# Patient Record
Sex: FEMALE | Race: WHITE | Employment: UNEMPLOYED | URBAN - METROPOLITAN AREA
[De-identification: names, ages, dates, MRNs, and addresses within clinical notes are randomized per-mention and may not be internally consistent; named-entity substitution may affect disease eponyms.]

---

## 2019-06-17 LAB
AVERAGE GLUCOSE: 114
CHOLESTEROL, TOTAL: 154 MG/DL
CHOLESTEROL/HDL RATIO: 2.9
CREATININE: 0.9 MG/DL
HBA1C MFR BLD: 5.6 %
HDLC SERPL-MCNC: 54 MG/DL (ref 35–70)
LDL CHOLESTEROL CALCULATED: 77 MG/DL (ref 0–160)
POTASSIUM (K+): 4.2
TRIGL SERPL-MCNC: 116 MG/DL
VLDLC SERPL CALC-MCNC: 23 MG/DL

## 2020-04-03 VITALS — RESPIRATION RATE: 14 BRPM | HEART RATE: 68 BPM | DIASTOLIC BLOOD PRESSURE: 70 MMHG | SYSTOLIC BLOOD PRESSURE: 130 MMHG

## 2020-04-03 RX ORDER — SIMVASTATIN 20 MG
20 TABLET ORAL NIGHTLY
COMMUNITY
End: 2021-06-01 | Stop reason: SDUPTHER

## 2020-04-03 RX ORDER — LISINOPRIL 5 MG/1
5 TABLET ORAL DAILY
COMMUNITY
End: 2021-06-01 | Stop reason: SDUPTHER

## 2020-04-03 RX ORDER — METOPROLOL SUCCINATE 25 MG/1
25 TABLET, EXTENDED RELEASE ORAL DAILY
COMMUNITY
End: 2020-10-22 | Stop reason: SDUPTHER

## 2020-04-03 RX ORDER — FAMOTIDINE 20 MG/1
20 TABLET, FILM COATED ORAL 2 TIMES DAILY
COMMUNITY

## 2020-04-03 RX ORDER — OMEPRAZOLE 20 MG/1
20 CAPSULE, DELAYED RELEASE ORAL DAILY
COMMUNITY

## 2020-07-14 LAB
AVERAGE GLUCOSE: NORMAL
CHOLESTEROL, TOTAL: 196 MG/DL
CHOLESTEROL/HDL RATIO: NORMAL
CREATININE: 0.9 MG/DL
HBA1C MFR BLD: 5.4 %
HDLC SERPL-MCNC: 58 MG/DL (ref 35–70)
LDL CHOLESTEROL CALCULATED: 112 MG/DL (ref 0–160)
POTASSIUM (K+): 4.5
TRIGL SERPL-MCNC: 132 MG/DL
VLDLC SERPL CALC-MCNC: 26 MG/DL

## 2020-07-22 VITALS
RESPIRATION RATE: 17 BRPM | TEMPERATURE: 96.8 F | HEIGHT: 61 IN | WEIGHT: 159 LBS | BODY MASS INDEX: 30.02 KG/M2 | SYSTOLIC BLOOD PRESSURE: 122 MMHG | HEART RATE: 62 BPM | DIASTOLIC BLOOD PRESSURE: 75 MMHG

## 2020-07-22 RX ORDER — WARFARIN SODIUM 2 MG/1
2 TABLET ORAL
COMMUNITY
End: 2021-11-05

## 2020-08-03 VITALS — DIASTOLIC BLOOD PRESSURE: 76 MMHG | SYSTOLIC BLOOD PRESSURE: 120 MMHG | RESPIRATION RATE: 12 BRPM | HEART RATE: 64 BPM

## 2020-08-05 LAB
INTERNATIONAL NORMALIZATION RATIO, POC: 1.8
PROTHROMBIN TIME, POC: 21.2

## 2020-10-22 ENCOUNTER — OFFICE VISIT (OUTPATIENT)
Dept: FAMILY MEDICINE CLINIC | Age: 85
End: 2020-10-22
Payer: MEDICARE

## 2020-10-22 VITALS
HEART RATE: 66 BPM | WEIGHT: 155 LBS | SYSTOLIC BLOOD PRESSURE: 130 MMHG | OXYGEN SATURATION: 96 % | HEIGHT: 62 IN | BODY MASS INDEX: 28.52 KG/M2 | DIASTOLIC BLOOD PRESSURE: 78 MMHG

## 2020-10-22 PROBLEM — I10 HYPERTENSION: Status: ACTIVE | Noted: 2020-10-22

## 2020-10-22 PROBLEM — E78.49 OTHER HYPERLIPIDEMIA: Status: ACTIVE | Noted: 2020-10-22

## 2020-10-22 PROBLEM — M19.91 PRIMARY OSTEOARTHRITIS: Status: ACTIVE | Noted: 2020-10-22

## 2020-10-22 PROBLEM — K21.9 GASTROESOPHAGEAL REFLUX DISEASE WITHOUT ESOPHAGITIS: Status: ACTIVE | Noted: 2020-10-22

## 2020-10-22 PROCEDURE — 90694 VACC AIIV4 NO PRSRV 0.5ML IM: CPT | Performed by: INTERNAL MEDICINE

## 2020-10-22 PROCEDURE — G0444 DEPRESSION SCREEN ANNUAL: HCPCS | Performed by: INTERNAL MEDICINE

## 2020-10-22 PROCEDURE — 99213 OFFICE O/P EST LOW 20 MIN: CPT | Performed by: INTERNAL MEDICINE

## 2020-10-22 PROCEDURE — G0008 ADMIN INFLUENZA VIRUS VAC: HCPCS | Performed by: INTERNAL MEDICINE

## 2020-10-22 RX ORDER — METOPROLOL SUCCINATE 25 MG/1
25 TABLET, EXTENDED RELEASE ORAL DAILY
Qty: 90 TABLET | Refills: 1 | Status: SHIPPED
Start: 2020-10-22 | End: 2021-05-10 | Stop reason: SDUPTHER

## 2020-10-22 ASSESSMENT — PATIENT HEALTH QUESTIONNAIRE - PHQ9
4. FEELING TIRED OR HAVING LITTLE ENERGY: 0
5. POOR APPETITE OR OVEREATING: 0
7. TROUBLE CONCENTRATING ON THINGS, SUCH AS READING THE NEWSPAPER OR WATCHING TELEVISION: 0
SUM OF ALL RESPONSES TO PHQ QUESTIONS 1-9: 2
SUM OF ALL RESPONSES TO PHQ QUESTIONS 1-9: 2
8. MOVING OR SPEAKING SO SLOWLY THAT OTHER PEOPLE COULD HAVE NOTICED. OR THE OPPOSITE, BEING SO FIGETY OR RESTLESS THAT YOU HAVE BEEN MOVING AROUND A LOT MORE THAN USUAL: 0
10. IF YOU CHECKED OFF ANY PROBLEMS, HOW DIFFICULT HAVE THESE PROBLEMS MADE IT FOR YOU TO DO YOUR WORK, TAKE CARE OF THINGS AT HOME, OR GET ALONG WITH OTHER PEOPLE: 0
SUM OF ALL RESPONSES TO PHQ QUESTIONS 1-9: 2
SUM OF ALL RESPONSES TO PHQ9 QUESTIONS 1 & 2: 2
3. TROUBLE FALLING OR STAYING ASLEEP: 0
2. FEELING DOWN, DEPRESSED OR HOPELESS: 1
1. LITTLE INTEREST OR PLEASURE IN DOING THINGS: 1
9. THOUGHTS THAT YOU WOULD BE BETTER OFF DEAD, OR OF HURTING YOURSELF: 0

## 2020-10-22 NOTE — PROGRESS NOTES
Subjective: Follow-up on hypertension  Knees are hurting I gave her injection last time did not help much  She has severe osteoarthritis  Recently was seen in orthopedic  Under a lot of stress daughter was diagnosed with leukemia  Patient was counseled     Chief Complaint   Patient presents with    3 Month Follow-Up        Past Medical History:   Diagnosis Date    [facility]     [facility] ([facility]     hx breast cancer     Depression     GERD (gastroesophageal reflux disease)     Hiatal hernia     Hyperlipidemia     Hypertension     Mitral valve regurgitation     Osteoarthritis     right knee        Social History     Socioeconomic History    Marital status:       Spouse name: Not on file    Number of children: Not on file    Years of education: Not on file    Highest education level: Not on file   Occupational History    Not on file   Social Needs    Financial resource strain: Not on file    Food insecurity     Worry: Not on file     Inability: Not on file    Transportation needs     Medical: Not on file     Non-medical: Not on file   Tobacco Use    Smoking status: Former Smoker     Packs/day: 0.50     Start date: 200     Last attempt to quit: 4/3/1982     Years since quittin.5    Smokeless tobacco: Never Used   Substance and Sexual Activity    Alcohol use: Not Currently    Drug use: Never    Sexual activity: Not on file   Lifestyle    Physical activity     Days per week: Not on file     Minutes per session: Not on file    Stress: Not on file   Relationships    Social connections     Talks on phone: Not on file     Gets together: Not on file     Attends L[employer] service: Not on file     Active member of club or organization: Not on file     Attends meetings of clubs or organizations: Not on file     Relationship status: Not on file    Intimate partner violence     Fear of current or ex partner: Not on file     Emotionally abused: Not on file     Physically abused:  07/07/2017 09:05 AM     10/19/2016 09:15 AM     01/20/2016 10:53 AM    K 4.5 07/14/2020    K 4.2 06/17/2019    K 4.0 07/07/2017 09:05 AM    K 4.3 10/19/2016 09:15 AM    K 4.1 01/20/2016 10:53 AM     07/07/2017 09:05 AM     10/19/2016 09:15 AM     01/20/2016 10:53 AM    CO2 23 07/07/2017 09:05 AM    CO2 23 10/19/2016 09:15 AM    CO2 23 01/20/2016 10:53 AM    BUN 20 07/07/2017 09:05 AM    BUN 14 10/19/2016 09:15 AM    BUN 16 01/20/2016 10:53 AM    CREATININE 0.9 07/14/2020    CREATININE 0.9 06/17/2019    CREATININE 1.0 07/07/2017 09:05 AM    CREATININE 1.0 10/19/2016 09:15 AM    CREATININE 0.9 01/20/2016 10:53 AM    GLUCOSE 89 07/07/2017 09:05 AM    GLUCOSE 86 10/19/2016 09:15 AM    GLUCOSE 83 01/20/2016 10:53 AM    GLUCOSE 95 01/27/2012 09:30 AM    GLUCOSE 92 11/10/2010 09:59 AM    CALCIUM 9.5 07/07/2017 09:05 AM    CALCIUM 10.1 10/19/2016 09:15 AM    CALCIUM 9.7 01/20/2016 10:53 AM       Last 3 CMP:    Lab Results   Component Value Date/Time     07/07/2017 09:05 AM     10/19/2016 09:15 AM     01/20/2016 10:53 AM    K 4.5 07/14/2020    K 4.2 06/17/2019    K 4.0 07/07/2017 09:05 AM    K 4.3 10/19/2016 09:15 AM    K 4.1 01/20/2016 10:53 AM     07/07/2017 09:05 AM     10/19/2016 09:15 AM     01/20/2016 10:53 AM    CO2 23 07/07/2017 09:05 AM    CO2 23 10/19/2016 09:15 AM    CO2 23 01/20/2016 10:53 AM    BUN 20 07/07/2017 09:05 AM    BUN 14 10/19/2016 09:15 AM    BUN 16 01/20/2016 10:53 AM    CREATININE 0.9 07/14/2020    CREATININE 0.9 06/17/2019    CREATININE 1.0 07/07/2017 09:05 AM    CREATININE 1.0 10/19/2016 09:15 AM    CREATININE 0.9 01/20/2016 10:53 AM    GLUCOSE 89 07/07/2017 09:05 AM    GLUCOSE 86 10/19/2016 09:15 AM    GLUCOSE 83 01/20/2016 10:53 AM    GLUCOSE 95 01/27/2012 09:30 AM    GLUCOSE 92 11/10/2010 09:59 AM    CALCIUM 9.5 07/07/2017 09:05 AM    CALCIUM 10.1 10/19/2016 09:15 AM    CALCIUM 9.7 01/20/2016 10:53 AM    PROT 7.0 07/07/2017 09:05 AM    PROT 7.7 10/19/2016 09:15 AM    PROT 7.2 01/20/2016 10:53 AM    LABALBU 4.0 07/07/2017 09:05 AM    LABALBU 4.1 10/19/2016 09:15 AM    LABALBU 4.1 01/20/2016 10:53 AM    LABALBU 4.3 01/27/2012 09:30 AM    LABALBU 4.1 11/10/2010 09:59 AM    BILITOT 0.4 07/07/2017 09:05 AM    BILITOT 0.3 10/19/2016 09:15 AM    BILITOT 0.4 01/20/2016 10:53 AM    ALKPHOS 46 07/07/2017 09:05 AM    ALKPHOS 48 10/19/2016 09:15 AM    ALKPHOS 49 01/20/2016 10:53 AM    AST 23 07/07/2017 09:05 AM    AST 27 10/19/2016 09:15 AM    AST 24 01/20/2016 10:53 AM    ALT 15 07/07/2017 09:05 AM    ALT 15 10/19/2016 09:15 AM    ALT 16 01/20/2016 10:53 AM        CBC:   Lab Results   Component Value Date/Time    WBC 5.8 07/07/2017 09:05 AM    RBC 4.27 07/07/2017 09:05 AM    HGB 13.0 07/07/2017 09:05 AM    HCT 39.1 07/07/2017 09:05 AM    MCV 91.6 07/07/2017 09:05 AM    MCH 30.4 07/07/2017 09:05 AM    MCHC 33.2 07/07/2017 09:05 AM    RDW 12.6 07/07/2017 09:05 AM     07/07/2017 09:05 AM    MPV 10.4 07/07/2017 09:05 AM       A1C:  Lab Results   Component Value Date/Time    LABA1C 5.4 07/14/2020       Lipid panel:  Lab Results   Component Value Date    CHOL 196 07/14/2020    CHOL 154 06/17/2019    CHOL 156 07/07/2017    TRIG 132 07/14/2020    TRIG 116 06/17/2019    TRIG 126 07/07/2017    HDL 58 07/14/2020    HDL 54 06/17/2019    HDL 53 07/07/2017        Lab Results   Component Value Date/Time    PROT 7.0 07/07/2017 09:05 AM    PROT 7.7 10/19/2016 09:15 AM    PROT 7.2 01/20/2016 10:53 AM    INR 1.8 08/05/2020       No results found for: MG      Assessment. Edwina was seen today for 3 month follow-up. Diagnoses and all orders for this visit:    Hypertension, unspecified type  -     metoprolol succinate (TOPROL XL) 25 MG extended release tablet;  Take 1 tablet by mouth daily    Other hyperlipidemia    Gastroesophageal reflux disease without esophagitis    Other orders  -     INFLUENZA, QUADV, ADJUVANTED, 65 YRS =, IM, PF, PREFILL SYR, 0.5ML (FLUAD)       Patient Active Problem List   Diagnosis    Hypertension    Other hyperlipidemia    Primary osteoarthritis    Gastroesophageal reflux disease without esophagitis       Plan: Her stool was Hemoccult positive last time she is refusing to see a GI  I told her other injection in the knee may not help with the cortisone she can have some gel shots with the orthopedic she will decide and let me know  Stay on the same blood pressure medication  She had a Holter monitor placed by cardiology final report is pending  Blood pressure good control    Return in about 3 months (around 1/22/2021) [facility]. Rohan Thompson MD  4:53 PM  10/22/2020     [place]

## 2020-11-18 ENCOUNTER — TELEPHONE (OUTPATIENT)
Dept: FAMILY MEDICINE CLINIC | Age: 85
End: 2020-11-18

## 2020-11-18 NOTE — TELEPHONE ENCOUNTER
Pt daughter called pt saw Dr. Dacia Mckoy was put her on coumadin 2.5 mg qd started 11/02/2020    pt to have tooth pulled how long to be off coumadin .     And will you follow her for the coumadin

## 2021-03-01 ENCOUNTER — NURSE ONLY (OUTPATIENT)
Dept: FAMILY MEDICINE CLINIC | Age: 86
End: 2021-03-01
Payer: MEDICARE

## 2021-03-01 DIAGNOSIS — Q84.6 ABNORMALITY OF NAIL TISSUE: ICD-10-CM

## 2021-03-01 DIAGNOSIS — H61.23 IMPACTED CERUMEN OF BOTH EARS: Primary | ICD-10-CM

## 2021-03-01 DIAGNOSIS — I48.91 ATRIAL FIBRILLATION, UNSPECIFIED TYPE (HCC): ICD-10-CM

## 2021-03-01 LAB
INTERNATIONAL NORMALIZATION RATIO, POC: 1.6
PROTHROMBIN TIME, POC: NORMAL

## 2021-03-01 PROCEDURE — 85610 PROTHROMBIN TIME: CPT | Performed by: INTERNAL MEDICINE

## 2021-03-01 PROCEDURE — 69210 REMOVE IMPACTED EAR WAX UNI: CPT | Performed by: INTERNAL MEDICINE

## 2021-03-01 NOTE — PROGRESS NOTES
Ear wax removal both ears. Canals clear no redness.       INR 1.6. patient is taking 5mg, 5mg, 2.5     Patient would like referral to podiatrist for nail trimming

## 2021-03-15 ENCOUNTER — NURSE ONLY (OUTPATIENT)
Dept: FAMILY MEDICINE CLINIC | Age: 86
End: 2021-03-15
Payer: MEDICARE

## 2021-03-15 DIAGNOSIS — I48.91 ATRIAL FIBRILLATION, UNSPECIFIED TYPE (HCC): ICD-10-CM

## 2021-03-15 LAB
INTERNATIONAL NORMALIZATION RATIO, POC: 4.2
INTERNATIONAL NORMALIZATION RATIO, POC: 4.2
PROTHROMBIN TIME, POC: NORMAL
PROTHROMBIN TIME, POC: NORMAL

## 2021-03-15 PROCEDURE — 85610 PROTHROMBIN TIME: CPT | Performed by: INTERNAL MEDICINE

## 2021-03-29 ENCOUNTER — NURSE ONLY (OUTPATIENT)
Dept: FAMILY MEDICINE CLINIC | Age: 86
End: 2021-03-29
Payer: MEDICARE

## 2021-03-29 DIAGNOSIS — I48.91 ATRIAL FIBRILLATION, UNSPECIFIED TYPE (HCC): ICD-10-CM

## 2021-03-29 LAB
INTERNATIONAL NORMALIZATION RATIO, POC: 2.9
PROTHROMBIN TIME, POC: NORMAL

## 2021-03-29 PROCEDURE — 85610 PROTHROMBIN TIME: CPT | Performed by: INTERNAL MEDICINE

## 2021-04-29 ENCOUNTER — OFFICE VISIT (OUTPATIENT)
Dept: FAMILY MEDICINE CLINIC | Age: 86
End: 2021-04-29
Payer: MEDICARE

## 2021-04-29 VITALS
TEMPERATURE: 96.8 F | OXYGEN SATURATION: 93 % | HEART RATE: 64 BPM | SYSTOLIC BLOOD PRESSURE: 134 MMHG | WEIGHT: 146 LBS | BODY MASS INDEX: 26.7 KG/M2 | DIASTOLIC BLOOD PRESSURE: 80 MMHG

## 2021-04-29 DIAGNOSIS — R73.9 HYPERGLYCEMIA: ICD-10-CM

## 2021-04-29 DIAGNOSIS — I48.91 ATRIAL FIBRILLATION, UNSPECIFIED TYPE (HCC): ICD-10-CM

## 2021-04-29 DIAGNOSIS — I10 ESSENTIAL HYPERTENSION: ICD-10-CM

## 2021-04-29 DIAGNOSIS — H40.9 GLAUCOMA, UNSPECIFIED GLAUCOMA TYPE, UNSPECIFIED LATERALITY: ICD-10-CM

## 2021-04-29 DIAGNOSIS — Z85.3 HISTORY OF BREAST CANCER: ICD-10-CM

## 2021-04-29 DIAGNOSIS — M17.12 OSTEOARTHRITIS OF LEFT KNEE, UNSPECIFIED OSTEOARTHRITIS TYPE: ICD-10-CM

## 2021-04-29 DIAGNOSIS — K21.9 GASTROESOPHAGEAL REFLUX DISEASE WITHOUT ESOPHAGITIS: Primary | ICD-10-CM

## 2021-04-29 DIAGNOSIS — E78.5 HYPERLIPIDEMIA, UNSPECIFIED HYPERLIPIDEMIA TYPE: ICD-10-CM

## 2021-04-29 LAB
INTERNATIONAL NORMALIZATION RATIO, POC: 2.1
PROTHROMBIN TIME, POC: NORMAL

## 2021-04-29 PROCEDURE — 85610 PROTHROMBIN TIME: CPT | Performed by: INTERNAL MEDICINE

## 2021-04-29 PROCEDURE — 99214 OFFICE O/P EST MOD 30 MIN: CPT | Performed by: INTERNAL MEDICINE

## 2021-04-29 ASSESSMENT — PATIENT HEALTH QUESTIONNAIRE - PHQ9
1. LITTLE INTEREST OR PLEASURE IN DOING THINGS: 0
SUM OF ALL RESPONSES TO PHQ QUESTIONS 1-9: 0
SUM OF ALL RESPONSES TO PHQ QUESTIONS 1-9: 0

## 2021-04-29 NOTE — PROGRESS NOTES
Subjective:     Chief Complaint   Patient presents with    Knee Pain   Complains of pain in the knee left knee hurts more than right she had injection in the past with some help she has tried cream which is not helping it hurts to walk    Complains of heartburns denies any dysphagia worse after heavy meals denies any chest pain or palpitation    Follow-up on hypertension and atrial fibrillation  Monitor PT/INR    Follow-up on the cholesterol    History of breast cancer has been stable        Past Medical History:   Diagnosis Date    Atrial fibrillation ([facility]     Cancer ([facility]     hx breast cancer     Depression     GERD (gastroesophageal reflux disease)     Hiatal hernia     Hyperlipidemia     Hypertension     Mitral valve regurgitation     Osteoarthritis     right knee        Social History     Socioeconomic History    Marital status:       Spouse name: Not on file    Number of children: Not on file    Years of education: Not on file    Highest education level: Not on file   Occupational History    Not on file   Social Needs    Financial resource strain: Not on file    Food insecurity     Worry: Not on file     Inability: Not on file    Transportation needs     Medical: Not on file     Non-medical: Not on file   Tobacco Use    Smoking status: Former Smoker     Packs/day: 0.50     Start date:      Quit date: 4/3/1982     Years since quittin.0    Smokeless tobacco: Never Used   Substance and Sexual Activity    Alcohol use: Not Currently    Drug use: Never    Sexual activity: Not on file   Lifestyle    Physical activity     Days per week: Not on file     Minutes per session: Not on file    Stress: Not on file   Relationships    Social connections     Talks on phone: Not on file     Gets together: Not on file     Attends [employer] service: Not on file     Active member of club or organization: Not on file     Attends meetings of clubs or organizations: Not on file Relationship status: Not on file    Intimate partner violence     Fear of current or ex partner: Not on file     Emotionally abused: Not on file     Physically abused: Not on file     Forced sexual activity: Not on file   Other Topics Concern    Not on file   Social History Narrative    Not on file        No past surgical history on file. Family History   Problem Relation Age of Onset    Ovarian Cancer Mother 66    Heart Attack Father 68    Parkinsonism Father         No Known Allergies     ROS  No acute distress  Cardiac: Denies any chest pain or palpitation  Atrial fibrillation on Coumadin tolerating well denies any active bleeding seen by Dr. Jean Pierre Stanley  Respiratory: Denies any cough or shortness of breath  GI: No abdominal pain. Denies any nausea vomiting or diarrhea  Complains of heartburns reflux symptoms  : Denies any dysuria frequency or hematuria  Neuro: No headache or dizziness  Endocrine: No diabetes  Skin: normal  No recent weight gain or weight loss  Recently diagnosed with glaucoma being followed by ophthalmologist    Objective:    /80   Pulse 64   Temp 96.8 °F (36 °C)   Wt 146 lb (66.2 kg)   SpO2 93%   BMI 26.70 kg/m²     Constitutional: Alert awake and oriented  Eyes: Pupils equal bilaterally. Extraocular muscles intact  Neck: no JVD adenopathy no bruit  Heart:  RRR, no murmurs, gallops, or rubs.   1/6 systolic murmur  Lungs:    no wheeze, rales or rhonchi  Abd: bowel sounds present, nontender, nondistended, no masses  Extrem:  No clubbing, cyanosis, or edema  Neuro: AAOx3,No Focal deficit  Psychological: no depression or anxiety   Vision she can count her fingers she can see things    Current Outpatient Medications   Medication Sig Dispense Refill    metoprolol succinate (TOPROL XL) 25 MG extended release tablet Take 1 tablet by mouth daily 90 tablet 1    warfarin (COUMADIN) 2 MG tablet Take 2 mg by mouth      simvastatin (ZOCOR) 20 MG tablet Take 20 mg by mouth nightly      lisinopril (PRINIVIL;ZESTRIL) 5 MG tablet Take 5 mg by mouth daily      aspirin 81 MG tablet Take 81 mg by mouth daily      omeprazole (PRILOSEC) 20 MG delayed release capsule Take 20 mg by mouth daily      famotidine (PEPCID) 20 MG tablet Take 20 mg by mouth 2 times daily       No current facility-administered medications for this visit.          Last 3 BMP  Lab Results   Component Value Date/Time     07/07/2017 09:05 AM     10/19/2016 09:15 AM     01/20/2016 10:53 AM    K 4.5 07/14/2020    K 4.2 06/17/2019    K 4.0 07/07/2017 09:05 AM    K 4.3 10/19/2016 09:15 AM    K 4.1 01/20/2016 10:53 AM     07/07/2017 09:05 AM     10/19/2016 09:15 AM     01/20/2016 10:53 AM    CO2 23 07/07/2017 09:05 AM    CO2 23 10/19/2016 09:15 AM    CO2 23 01/20/2016 10:53 AM    BUN 20 07/07/2017 09:05 AM    BUN 14 10/19/2016 09:15 AM    BUN 16 01/20/2016 10:53 AM    CREATININE 0.9 07/14/2020    CREATININE 0.9 06/17/2019    CREATININE 1.0 07/07/2017 09:05 AM    CREATININE 1.0 10/19/2016 09:15 AM    CREATININE 0.9 01/20/2016 10:53 AM    GLUCOSE 89 07/07/2017 09:05 AM    GLUCOSE 86 10/19/2016 09:15 AM    GLUCOSE 83 01/20/2016 10:53 AM    GLUCOSE 95 01/27/2012 09:30 AM    GLUCOSE 92 11/10/2010 09:59 AM    CALCIUM 9.5 07/07/2017 09:05 AM    CALCIUM 10.1 10/19/2016 09:15 AM    CALCIUM 9.7 01/20/2016 10:53 AM       Last 3 CMP:    Lab Results   Component Value Date/Time     07/07/2017 09:05 AM     10/19/2016 09:15 AM     01/20/2016 10:53 AM    K 4.5 07/14/2020    K 4.2 06/17/2019    K 4.0 07/07/2017 09:05 AM    K 4.3 10/19/2016 09:15 AM    K 4.1 01/20/2016 10:53 AM     07/07/2017 09:05 AM     10/19/2016 09:15 AM     01/20/2016 10:53 AM    CO2 23 07/07/2017 09:05 AM    CO2 23 10/19/2016 09:15 AM    CO2 23 01/20/2016 10:53 AM    BUN 20 07/07/2017 09:05 AM    BUN 14 10/19/2016 09:15 AM    BUN 16 01/20/2016 10:53 AM    CREATININE 0.9 07/14/2020    CREATININE 0.9 06/17/2019 CREATININE 1.0 07/07/2017 09:05 AM    CREATININE 1.0 10/19/2016 09:15 AM    CREATININE 0.9 01/20/2016 10:53 AM    GLUCOSE 89 07/07/2017 09:05 AM    GLUCOSE 86 10/19/2016 09:15 AM    GLUCOSE 83 01/20/2016 10:53 AM    GLUCOSE 95 01/27/2012 09:30 AM    GLUCOSE 92 11/10/2010 09:59 AM    CALCIUM 9.5 07/07/2017 09:05 AM    CALCIUM 10.1 10/19/2016 09:15 AM    CALCIUM 9.7 01/20/2016 10:53 AM    PROT 7.0 07/07/2017 09:05 AM    PROT 7.7 10/19/2016 09:15 AM    PROT 7.2 01/20/2016 10:53 AM    LABALBU 4.0 07/07/2017 09:05 AM    LABALBU 4.1 10/19/2016 09:15 AM    LABALBU 4.1 01/20/2016 10:53 AM    LABALBU 4.3 01/27/2012 09:30 AM    LABALBU 4.1 11/10/2010 09:59 AM    BILITOT 0.4 07/07/2017 09:05 AM    BILITOT 0.3 10/19/2016 09:15 AM    BILITOT 0.4 01/20/2016 10:53 AM    ALKPHOS 46 07/07/2017 09:05 AM    ALKPHOS 48 10/19/2016 09:15 AM    ALKPHOS 49 01/20/2016 10:53 AM    AST 23 07/07/2017 09:05 AM    AST 27 10/19/2016 09:15 AM    AST 24 01/20/2016 10:53 AM    ALT 15 07/07/2017 09:05 AM    ALT 15 10/19/2016 09:15 AM    ALT 16 01/20/2016 10:53 AM        CBC:   Lab Results   Component Value Date/Time    WBC 5.8 07/07/2017 09:05 AM    RBC 4.27 07/07/2017 09:05 AM    HGB 13.0 07/07/2017 09:05 AM    HCT 39.1 07/07/2017 09:05 AM    MCV 91.6 07/07/2017 09:05 AM    MCH 30.4 07/07/2017 09:05 AM    MCHC 33.2 07/07/2017 09:05 AM    RDW 12.6 07/07/2017 09:05 AM     07/07/2017 09:05 AM    MPV 10.4 07/07/2017 09:05 AM       A1C:  Lab Results   Component Value Date/Time    LABA1C 5.4 07/14/2020       Lipid panel:  Lab Results   Component Value Date    CHOL 196 07/14/2020    CHOL 154 06/17/2019    CHOL 156 07/07/2017    TRIG 132 07/14/2020    TRIG 116 06/17/2019    TRIG 126 07/07/2017    HDL 58 07/14/2020    HDL 54 06/17/2019    HDL 53 07/07/2017        Lab Results   Component Value Date/Time    PROT 7.0 07/07/2017 09:05 AM    PROT 7.7 10/19/2016 09:15 AM    PROT 7.2 01/20/2016 10:53 AM    INR 2.1 04/29/2021 12:00 PM    INR 2.9 03/29/2021 11:33 AM    INR 4.2 03/15/2021 11:00 AM    INR 4.2 03/15/2021 11:00 AM       No results found for: MG      Assessment. Edwina was seen today for knee pain. Diagnoses and all orders for this visit:    Gastroesophageal reflux disease without esophagitis    [facility] ([facility]  -     POCT INR  -     CBC WITH AUTO DIFFERENTIAL; Future    Essential hypertension    Hyperglycemia  -     HEMOGLOBIN A1C; Future    Hyperlipidemia, unspecified hyperlipidemia type  -     COMPREHENSIVE METABOLIC PANEL; Future  -     LIPID PANEL; Future  -     TSH; Future    Osteoarthritis of left knee, unspecified osteoarthritis type    History of breast cancer    Glaucoma, unspecified glaucoma type, unspecified laterality       Patient Active Problem List   Diagnosis    Hypertension    Other hyperlipidemia    Primary osteoarthritis    Gastroesophageal reflux disease without esophagitis    History of breast cancer    Hyperlipidemia       Plan: GE reflux disease Prilosec 20 mg daily if no improvement she can use Pepcid at night  See a gastroenterologist if no improvement call if any dysphagia  She is declining colonoscopy has been discussed many times in presence of the daughter    Osteoarthritis left knee range of motion painful try Tylenol arthritis 2 tablets every 8 hours as needed monitor liver function kidney function    Atrial fibrillation controlled appears in sinus rhythm INR 2.1 continue Coumadin 5 mg for 2 days 2.5 every third day check pro time in 1 month    Hypertension controlled continue lisinopril    Hyperlipidemia check lipid profile and CMP continue simvastatin 20      Glaucoma continue follow-up with the   Ophthalmologist, use walker fall precaution discussed    Monitor pro time once a month    Check CBC, CMP, lipid, TSH,    Return in about 6 months (around 10/29/2021) for ANNUAL PHYSICAL.        Belgica Ludwig MD  12:56 PM  4/29/2021     [place]

## 2021-05-10 DIAGNOSIS — I10 HYPERTENSION, UNSPECIFIED TYPE: ICD-10-CM

## 2021-05-10 RX ORDER — METOPROLOL SUCCINATE 25 MG/1
25 TABLET, EXTENDED RELEASE ORAL DAILY
Qty: 90 TABLET | Refills: 1 | Status: SHIPPED
Start: 2021-05-10 | End: 2021-11-05 | Stop reason: SDUPTHER

## 2021-05-26 ENCOUNTER — NURSE ONLY (OUTPATIENT)
Dept: FAMILY MEDICINE CLINIC | Age: 86
End: 2021-05-26
Payer: MEDICARE

## 2021-05-26 DIAGNOSIS — I48.91 ATRIAL FIBRILLATION, UNSPECIFIED TYPE (HCC): ICD-10-CM

## 2021-05-26 LAB
INTERNATIONAL NORMALIZATION RATIO, POC: 3.9
PROTHROMBIN TIME, POC: NORMAL

## 2021-05-26 PROCEDURE — 85610 PROTHROMBIN TIME: CPT | Performed by: INTERNAL MEDICINE

## 2021-05-26 PROCEDURE — 93793 ANTICOAG MGMT PT WARFARIN: CPT | Performed by: INTERNAL MEDICINE

## 2021-06-01 RX ORDER — LISINOPRIL 5 MG/1
5 TABLET ORAL DAILY
Qty: 90 TABLET | Refills: 1 | Status: SHIPPED
Start: 2021-06-01 | End: 2021-11-05 | Stop reason: SDUPTHER

## 2021-06-01 RX ORDER — SIMVASTATIN 20 MG
20 TABLET ORAL NIGHTLY
Qty: 90 TABLET | Refills: 1 | Status: SHIPPED
Start: 2021-06-01 | End: 2021-11-05 | Stop reason: SDUPTHER

## 2021-06-16 ENCOUNTER — NURSE ONLY (OUTPATIENT)
Dept: FAMILY MEDICINE CLINIC | Age: 86
End: 2021-06-16
Payer: MEDICARE

## 2021-06-16 DIAGNOSIS — I48.91 ATRIAL FIBRILLATION, UNSPECIFIED TYPE (HCC): ICD-10-CM

## 2021-06-16 LAB
INTERNATIONAL NORMALIZATION RATIO, POC: 2.9
PROTHROMBIN TIME, POC: NORMAL

## 2021-06-16 PROCEDURE — 85610 PROTHROMBIN TIME: CPT | Performed by: INTERNAL MEDICINE

## 2021-06-16 PROCEDURE — 93793 ANTICOAG MGMT PT WARFARIN: CPT | Performed by: INTERNAL MEDICINE

## 2021-07-14 ENCOUNTER — NURSE ONLY (OUTPATIENT)
Dept: FAMILY MEDICINE CLINIC | Age: 86
End: 2021-07-14
Payer: MEDICARE

## 2021-07-14 DIAGNOSIS — I48.91 ATRIAL FIBRILLATION, UNSPECIFIED TYPE (HCC): ICD-10-CM

## 2021-07-14 LAB
INTERNATIONAL NORMALIZATION RATIO, POC: 1.8
PROTHROMBIN TIME, POC: NORMAL

## 2021-07-14 PROCEDURE — 93793 ANTICOAG MGMT PT WARFARIN: CPT | Performed by: INTERNAL MEDICINE

## 2021-07-14 PROCEDURE — 85610 PROTHROMBIN TIME: CPT | Performed by: INTERNAL MEDICINE

## 2021-09-01 ENCOUNTER — NURSE ONLY (OUTPATIENT)
Dept: FAMILY MEDICINE CLINIC | Age: 86
End: 2021-09-01
Payer: MEDICARE

## 2021-09-01 DIAGNOSIS — I48.91 ATRIAL FIBRILLATION, UNSPECIFIED TYPE (HCC): ICD-10-CM

## 2021-09-01 LAB
INTERNATIONAL NORMALIZATION RATIO, POC: 2.5
PROTHROMBIN TIME, POC: NORMAL

## 2021-09-01 PROCEDURE — 93793 ANTICOAG MGMT PT WARFARIN: CPT | Performed by: INTERNAL MEDICINE

## 2021-09-01 PROCEDURE — 85610 PROTHROMBIN TIME: CPT | Performed by: INTERNAL MEDICINE

## 2021-09-29 ENCOUNTER — NURSE ONLY (OUTPATIENT)
Dept: FAMILY MEDICINE CLINIC | Age: 86
End: 2021-09-29
Payer: MEDICARE

## 2021-09-29 DIAGNOSIS — I48.91 ATRIAL FIBRILLATION, UNSPECIFIED TYPE (HCC): ICD-10-CM

## 2021-09-29 DIAGNOSIS — Z23 NEED FOR IMMUNIZATION AGAINST INFLUENZA: Primary | ICD-10-CM

## 2021-09-29 LAB
INTERNATIONAL NORMALIZATION RATIO, POC: 2
PROTHROMBIN TIME, POC: NORMAL

## 2021-09-29 PROCEDURE — 93793 ANTICOAG MGMT PT WARFARIN: CPT | Performed by: INTERNAL MEDICINE

## 2021-09-29 PROCEDURE — 90694 VACC AIIV4 NO PRSRV 0.5ML IM: CPT | Performed by: INTERNAL MEDICINE

## 2021-09-29 PROCEDURE — G0008 ADMIN INFLUENZA VIRUS VAC: HCPCS | Performed by: INTERNAL MEDICINE

## 2021-09-29 PROCEDURE — 85610 PROTHROMBIN TIME: CPT | Performed by: INTERNAL MEDICINE

## 2021-10-27 DIAGNOSIS — E78.5 HYPERLIPIDEMIA, UNSPECIFIED HYPERLIPIDEMIA TYPE: ICD-10-CM

## 2021-10-27 DIAGNOSIS — I48.91 ATRIAL FIBRILLATION, UNSPECIFIED TYPE (HCC): ICD-10-CM

## 2021-10-27 DIAGNOSIS — R73.9 HYPERGLYCEMIA: ICD-10-CM

## 2021-10-27 LAB
ALBUMIN SERPL-MCNC: 4.3 G/DL (ref 3.5–5.2)
ALP BLD-CCNC: 63 U/L (ref 35–104)
ALT SERPL-CCNC: 13 U/L (ref 0–32)
ANION GAP SERPL CALCULATED.3IONS-SCNC: 14 MMOL/L (ref 7–16)
AST SERPL-CCNC: 28 U/L (ref 0–31)
BASOPHILS ABSOLUTE: 0.07 E9/L (ref 0–0.2)
BASOPHILS RELATIVE PERCENT: 1 % (ref 0–2)
BILIRUB SERPL-MCNC: 0.5 MG/DL (ref 0–1.2)
BUN BLDV-MCNC: 18 MG/DL (ref 6–23)
CALCIUM SERPL-MCNC: 9.6 MG/DL (ref 8.6–10.2)
CHLORIDE BLD-SCNC: 108 MMOL/L (ref 98–107)
CHOLESTEROL, TOTAL: 191 MG/DL (ref 0–199)
CO2: 20 MMOL/L (ref 22–29)
CREAT SERPL-MCNC: 0.9 MG/DL (ref 0.5–1)
EOSINOPHILS ABSOLUTE: 0.2 E9/L (ref 0.05–0.5)
EOSINOPHILS RELATIVE PERCENT: 3 % (ref 0–6)
GFR AFRICAN AMERICAN: >60
GFR NON-AFRICAN AMERICAN: 58 ML/MIN/1.73
GLUCOSE BLD-MCNC: 89 MG/DL (ref 74–99)
HBA1C MFR BLD: 5.6 % (ref 4–5.6)
HCT VFR BLD CALC: 40.7 % (ref 34–48)
HDLC SERPL-MCNC: 52 MG/DL
HEMOGLOBIN: 12.9 G/DL (ref 11.5–15.5)
IMMATURE GRANULOCYTES #: 0.04 E9/L
IMMATURE GRANULOCYTES %: 0.6 % (ref 0–5)
LDL CHOLESTEROL CALCULATED: 110 MG/DL (ref 0–99)
LYMPHOCYTES ABSOLUTE: 2.41 E9/L (ref 1.5–4)
LYMPHOCYTES RELATIVE PERCENT: 36.1 % (ref 20–42)
MCH RBC QN AUTO: 30.1 PG (ref 26–35)
MCHC RBC AUTO-ENTMCNC: 31.7 % (ref 32–34.5)
MCV RBC AUTO: 95.1 FL (ref 80–99.9)
MONOCYTES ABSOLUTE: 0.66 E9/L (ref 0.1–0.95)
MONOCYTES RELATIVE PERCENT: 9.9 % (ref 2–12)
NEUTROPHILS ABSOLUTE: 3.29 E9/L (ref 1.8–7.3)
NEUTROPHILS RELATIVE PERCENT: 49.4 % (ref 43–80)
PDW BLD-RTO: 13 FL (ref 11.5–15)
PLATELET # BLD: 246 E9/L (ref 130–450)
PMV BLD AUTO: 10.3 FL (ref 7–12)
POTASSIUM SERPL-SCNC: 4.7 MMOL/L (ref 3.5–5)
RBC # BLD: 4.28 E12/L (ref 3.5–5.5)
SODIUM BLD-SCNC: 142 MMOL/L (ref 132–146)
TOTAL PROTEIN: 7 G/DL (ref 6.4–8.3)
TRIGL SERPL-MCNC: 143 MG/DL (ref 0–149)
TSH SERPL DL<=0.05 MIU/L-ACNC: 1.5 UIU/ML (ref 0.27–4.2)
VLDLC SERPL CALC-MCNC: 29 MG/DL
WBC # BLD: 6.7 E9/L (ref 4.5–11.5)

## 2021-11-05 ENCOUNTER — OFFICE VISIT (OUTPATIENT)
Dept: FAMILY MEDICINE CLINIC | Age: 86
End: 2021-11-05
Payer: MEDICARE

## 2021-11-05 VITALS
RESPIRATION RATE: 16 BRPM | BODY MASS INDEX: 27.82 KG/M2 | TEMPERATURE: 97.8 F | DIASTOLIC BLOOD PRESSURE: 60 MMHG | HEART RATE: 59 BPM | WEIGHT: 151.2 LBS | OXYGEN SATURATION: 97 % | SYSTOLIC BLOOD PRESSURE: 156 MMHG | HEIGHT: 62 IN

## 2021-11-05 DIAGNOSIS — I10 HYPERTENSION, UNSPECIFIED TYPE: ICD-10-CM

## 2021-11-05 DIAGNOSIS — K21.9 GASTROESOPHAGEAL REFLUX DISEASE WITHOUT ESOPHAGITIS: ICD-10-CM

## 2021-11-05 DIAGNOSIS — I48.11 LONGSTANDING PERSISTENT ATRIAL FIBRILLATION (HCC): ICD-10-CM

## 2021-11-05 DIAGNOSIS — H40.9 GLAUCOMA, UNSPECIFIED GLAUCOMA TYPE, UNSPECIFIED LATERALITY: ICD-10-CM

## 2021-11-05 DIAGNOSIS — E78.5 HYPERLIPIDEMIA, UNSPECIFIED HYPERLIPIDEMIA TYPE: ICD-10-CM

## 2021-11-05 DIAGNOSIS — Z85.3 HISTORY OF BREAST CANCER: ICD-10-CM

## 2021-11-05 DIAGNOSIS — Z00.00 ROUTINE GENERAL MEDICAL EXAMINATION AT A HEALTH CARE FACILITY: Primary | ICD-10-CM

## 2021-11-05 LAB
INTERNATIONAL NORMALIZATION RATIO, POC: 2.5
PROTHROMBIN TIME, POC: 29.7

## 2021-11-05 PROCEDURE — 99214 OFFICE O/P EST MOD 30 MIN: CPT | Performed by: INTERNAL MEDICINE

## 2021-11-05 PROCEDURE — G0438 PPPS, INITIAL VISIT: HCPCS | Performed by: INTERNAL MEDICINE

## 2021-11-05 PROCEDURE — 85610 PROTHROMBIN TIME: CPT | Performed by: INTERNAL MEDICINE

## 2021-11-05 RX ORDER — METOPROLOL SUCCINATE 25 MG/1
25 TABLET, EXTENDED RELEASE ORAL DAILY
Qty: 90 TABLET | Refills: 1 | Status: SHIPPED | OUTPATIENT
Start: 2021-11-05 | End: 2022-04-18 | Stop reason: SDUPTHER

## 2021-11-05 RX ORDER — LISINOPRIL 10 MG/1
10 TABLET ORAL DAILY
Qty: 90 TABLET | Refills: 1 | Status: SHIPPED | OUTPATIENT
Start: 2021-11-05 | End: 2022-04-18 | Stop reason: SDUPTHER

## 2021-11-05 RX ORDER — WARFARIN SODIUM 5 MG/1
5 TABLET ORAL DAILY
Qty: 90 TABLET | Refills: 1 | Status: SHIPPED | OUTPATIENT
Start: 2021-11-05

## 2021-11-05 RX ORDER — SIMVASTATIN 20 MG
20 TABLET ORAL NIGHTLY
Qty: 90 TABLET | Refills: 1 | Status: SHIPPED | OUTPATIENT
Start: 2021-11-05 | End: 2022-04-18 | Stop reason: SDUPTHER

## 2021-11-05 RX ORDER — WARFARIN SODIUM 5 MG/1
5 TABLET ORAL
COMMUNITY
End: 2021-11-05 | Stop reason: SDUPTHER

## 2021-11-05 RX ORDER — WARFARIN SODIUM 2.5 MG/1
2.5 TABLET ORAL DAILY
Qty: 90 TABLET | Refills: 1 | Status: SHIPPED | OUTPATIENT
Start: 2021-11-05 | End: 2022-09-21 | Stop reason: SDUPTHER

## 2021-11-05 RX ORDER — WARFARIN SODIUM 2.5 MG/1
2.5 TABLET ORAL
COMMUNITY
End: 2021-11-05 | Stop reason: SDUPTHER

## 2021-11-05 ASSESSMENT — LIFESTYLE VARIABLES
AUDIT-C TOTAL SCORE: INCOMPLETE
HOW OFTEN DO YOU HAVE A DRINK CONTAINING ALCOHOL: NEVER
HOW OFTEN DO YOU HAVE A DRINK CONTAINING ALCOHOL: 0
AUDIT TOTAL SCORE: INCOMPLETE

## 2021-11-05 ASSESSMENT — PATIENT HEALTH QUESTIONNAIRE - PHQ9
SUM OF ALL RESPONSES TO PHQ QUESTIONS 1-9: 0
SUM OF ALL RESPONSES TO PHQ QUESTIONS 1-9: 0
1. LITTLE INTEREST OR PLEASURE IN DOING THINGS: 0
SUM OF ALL RESPONSES TO PHQ QUESTIONS 1-9: 0
SUM OF ALL RESPONSES TO PHQ9 QUESTIONS 1 & 2: 0
2. FEELING DOWN, DEPRESSED OR HOPELESS: 0

## 2021-11-05 NOTE — PATIENT INSTRUCTIONS
Personalized Preventive Plan for Keith Dailey - 11/5/2021  Medicare offers a range of preventive health benefits. Some of the tests and screenings are paid in full while other may be subject to a deductible, co-insurance, and/or copay. Some of these benefits include a comprehensive review of your medical history including lifestyle, illnesses that may run in your family, and various assessments and screenings as appropriate. After reviewing your medical record and screening and assessments performed today your provider may have ordered immunizations, labs, imaging, and/or referrals for you. A list of these orders (if applicable) as well as your Preventive Care list are included within your After Visit Summary for your review. Other Preventive Recommendations:    · A preventive eye exam performed by an eye specialist is recommended every 1-2 years to screen for glaucoma; cataracts, macular degeneration, and other eye disorders. · A preventive dental visit is recommended every 6 months. · Try to get at least 150 minutes of exercise per week or 10,000 steps per day on a pedometer . · Order or download the FREE \"Exercise & Physical Activity: Your Everyday Guide\" from [employer]. Call 1-227.152.4236 or search [employer] online. · You need 4319-8476 mg of calcium and 5501-3197 IU of vitamin D per day. It is possible to meet your calcium requirement with diet alone, but a vitamin D supplement is usually necessary to meet this goal.  · When exposed to the sun, use a sunscreen that protects against both UVA and UVB radiation with an SPF of 30 or greater. Reapply every 2 to 3 hours or after sweating, drying off with a towel, or swimming. · Always wear a seat belt when traveling in a car. Always wear a helmet when riding a bicycle or motorcycle.

## 2021-12-06 ENCOUNTER — NURSE ONLY (OUTPATIENT)
Dept: FAMILY MEDICINE CLINIC | Age: 86
End: 2021-12-06
Payer: MEDICARE

## 2021-12-06 DIAGNOSIS — I48.11 LONGSTANDING PERSISTENT ATRIAL FIBRILLATION (HCC): ICD-10-CM

## 2021-12-06 LAB
INTERNATIONAL NORMALIZATION RATIO, POC: 3.2
PROTHROMBIN TIME, POC: NORMAL

## 2021-12-06 PROCEDURE — 85610 PROTHROMBIN TIME: CPT | Performed by: INTERNAL MEDICINE

## 2021-12-06 PROCEDURE — 93793 ANTICOAG MGMT PT WARFARIN: CPT | Performed by: INTERNAL MEDICINE

## 2021-12-06 NOTE — PROGRESS NOTES
Take Coumadin 2.5 mg 2 days and then 5 mg  every third day, please ignore the other message every Thursday

## 2022-01-03 DIAGNOSIS — I10 HYPERTENSION, UNSPECIFIED TYPE: ICD-10-CM

## 2022-01-03 LAB
ANION GAP SERPL CALCULATED.3IONS-SCNC: 14 MMOL/L (ref 7–16)
BUN BLDV-MCNC: 16 MG/DL (ref 6–23)
CALCIUM SERPL-MCNC: 9.7 MG/DL (ref 8.6–10.2)
CHLORIDE BLD-SCNC: 107 MMOL/L (ref 98–107)
CO2: 22 MMOL/L (ref 22–29)
CREAT SERPL-MCNC: 1 MG/DL (ref 0.5–1)
GFR AFRICAN AMERICAN: >60
GFR NON-AFRICAN AMERICAN: 51 ML/MIN/1.73
GLUCOSE BLD-MCNC: 91 MG/DL (ref 74–99)
POTASSIUM SERPL-SCNC: 4.7 MMOL/L (ref 3.5–5)
SODIUM BLD-SCNC: 143 MMOL/L (ref 132–146)

## 2022-01-07 ENCOUNTER — OFFICE VISIT (OUTPATIENT)
Dept: FAMILY MEDICINE CLINIC | Age: 87
End: 2022-01-07
Payer: MEDICARE

## 2022-01-07 ENCOUNTER — TELEPHONE (OUTPATIENT)
Dept: FAMILY MEDICINE CLINIC | Age: 87
End: 2022-01-07

## 2022-01-07 VITALS
BODY MASS INDEX: 27.62 KG/M2 | OXYGEN SATURATION: 99 % | TEMPERATURE: 96.4 F | WEIGHT: 151 LBS | DIASTOLIC BLOOD PRESSURE: 80 MMHG | HEART RATE: 58 BPM | SYSTOLIC BLOOD PRESSURE: 130 MMHG

## 2022-01-07 DIAGNOSIS — Z85.3 HISTORY OF BREAST CANCER: ICD-10-CM

## 2022-01-07 DIAGNOSIS — K21.9 GASTROESOPHAGEAL REFLUX DISEASE WITHOUT ESOPHAGITIS: ICD-10-CM

## 2022-01-07 DIAGNOSIS — M17.0 PRIMARY OSTEOARTHRITIS OF BOTH KNEES: Primary | ICD-10-CM

## 2022-01-07 DIAGNOSIS — I10 PRIMARY HYPERTENSION: ICD-10-CM

## 2022-01-07 DIAGNOSIS — I48.91 ATRIAL FIBRILLATION, UNSPECIFIED TYPE (HCC): ICD-10-CM

## 2022-01-07 LAB — INTERNATIONAL NORMALIZATION RATIO, POC: 4.2

## 2022-01-07 PROCEDURE — 99213 OFFICE O/P EST LOW 20 MIN: CPT | Performed by: INTERNAL MEDICINE

## 2022-01-07 PROCEDURE — 85610 PROTHROMBIN TIME: CPT | Performed by: INTERNAL MEDICINE

## 2022-01-07 RX ORDER — ACETAMINOPHEN AND CODEINE PHOSPHATE 300; 30 MG/1; MG/1
1 TABLET ORAL EVERY 8 HOURS PRN
Qty: 30 TABLET | Refills: 0 | Status: SHIPPED | OUTPATIENT
Start: 2022-01-07 | End: 2022-02-06

## 2022-01-07 RX ORDER — CELECOXIB 200 MG/1
200 CAPSULE ORAL DAILY
Qty: 30 CAPSULE | Refills: 1 | Status: SHIPPED | OUTPATIENT
Start: 2022-01-07 | End: 2022-02-01 | Stop reason: SDUPTHER

## 2022-01-07 SDOH — ECONOMIC STABILITY: FOOD INSECURITY: WITHIN THE PAST 12 MONTHS, YOU WORRIED THAT YOUR FOOD WOULD RUN OUT BEFORE YOU GOT MONEY TO BUY MORE.: NEVER TRUE

## 2022-01-07 SDOH — ECONOMIC STABILITY: FOOD INSECURITY: WITHIN THE PAST 12 MONTHS, THE FOOD YOU BOUGHT JUST DIDN'T LAST AND YOU DIDN'T HAVE MONEY TO GET MORE.: NEVER TRUE

## 2022-01-07 ASSESSMENT — SOCIAL DETERMINANTS OF HEALTH (SDOH): HOW HARD IS IT FOR YOU TO PAY FOR THE VERY BASICS LIKE FOOD, HOUSING, MEDICAL CARE, AND HEATING?: NOT HARD AT ALL

## 2022-01-07 NOTE — PROGRESS NOTES
Subjective:     Chief Complaint   Patient presents with    Knee Pain   Patient complains of pain in both knees very hard to walk, she is taking 4 Tylenol every day, it does not help,  She had injection in the knee without much help,    Follow-up on hypertension and the blood work,    Follow-up on the Coumadin check in atrial fibrillation,    Past Medical History:   Diagnosis Date    [facility]     Cancer ([facility]     hx breast cancer     Depression     GERD (gastroesophageal reflux disease)     Hiatal hernia     Hyperlipidemia     Hypertension     Mitral valve regurgitation     Osteoarthritis     right knee        Social History     Socioeconomic History    Marital status:      Spouse name: Not on file    Number of children: Not on file    Years of education: Not on file    Highest education level: Not on file   Occupational History    Not on file   Tobacco Use    Smoking status: Former Smoker     Packs/day: 0.50     Start date:      Quit date: 4/3/1982     Years since quittin.7    Smokeless tobacco: Never Used   Substance and Sexual Activity    Alcohol use: Not Currently    Drug use: Never    Sexual activity: Not on file   Other Topics Concern    Not on file   Social History Narrative    Not on file     Social Determinants of Health     Financial Resource Strain: Low Risk     Difficulty of Paying Living Expenses: Not hard at all   Food Insecurity: No Food Insecurity    Worried About [address] [employer] in the Last Year: Never true    [facility] in the Last Year: Never true   Transportation Needs:     Lack of Transportation (Medical): Not on file    Lack of Transportation (Non-Medical):  Not on file   Physical Activity:     Days of Exercise per Week: Not on file    Minutes of Exercise per Session: Not on file   Stress:     Feeling of Stress : Not on file   Social Connections:     Frequency of Communication with Friends and Family: Not on file    Frequency of Social Gatherings with Friends and Family: Not on file    Attends Catholic Services: Not on file    Active Member of Clubs or Organizations: Not on file    Attends Club or Organization Meetings: Not on file    Marital Status: Not on file   Intimate Partner Violence:     Fear of Current or Ex-Partner: Not on file    Emotionally Abused: Not on file    Physically Abused: Not on file    Sexually Abused: Not on file   Housing Stability:     Unable to Pay for Housing in the Last Year: Not on file    Number of [place] in the Last Year: Not on file    Unstable Housing in the Last Year: Not on file        History reviewed. No pertinent surgical history. Family History   Problem Relation Age of Onset    Ovarian Cancer Mother 66    Heart Attack Father 68    Parkinsonism Father         No Known Allergies     ROS  No acute distress  Cardiac: Denies any chest pain or palpitation  Atrial fibrillation on Coumadin tolerating well  Saw Dr. Whtie 1 week ago  Respiratory: Denies any cough or shortness of breath  GI: No abdominal pain. Denies any nausea vomiting or diarrhea  GE reflux doing much better with omeprazole, denies any dysphagia, denies any worsening of symptoms,  : Denies any dysuria frequency or hematuria  Neuro: No headache or dizziness  Endocrine: No diabetes  Skin: normal  Has glaucoma has appointment to see the retina specialist  Denies any change in vision    Objective:    /80   Pulse 58   Temp 96.4 °F (35.8 °C)   Wt 151 lb (68.5 kg)   SpO2 99%   BMI 27.62 kg/m²     Constitutional: Alert awake and oriented  Eyes: Pupils equal bilaterally. Extraocular muscles intact  Neck: no JVD adenopathy no bruit  Heart:  RRR, no murmurs, gallops, or rubs.   Lungs:    no wheeze, rales or rhonchi  Abd: bowel sounds present, nontender, nondistended, no masses  Extrem:  No clubbing, cyanosis, or edema  Neuro: AAOx3,No Focal deficit  Psychological: no depression or anxiety   Knee examination range of motion is painful, there is crepitation, no swelling    Current Outpatient Medications   Medication Sig Dispense Refill    acetaminophen-codeine (TYLENOL/CODEINE #3) 300-30 MG per tablet Take 1 tablet by mouth every 8 hours as needed for Pain for up to 30 days. Intended supply: 30 days. Take lowest dose possible to manage pain 30 tablet 0    celecoxib (CELEBREX) 200 MG capsule Take 1 capsule by mouth daily 30 capsule 1    simvastatin (ZOCOR) 20 MG tablet Take 1 tablet by mouth nightly 90 tablet 1    metoprolol succinate (TOPROL XL) 25 MG extended release tablet Take 1 tablet by mouth daily 90 tablet 1    lisinopril (PRINIVIL;ZESTRIL) 10 MG tablet Take 1 tablet by mouth daily 90 tablet 1    warfarin (COUMADIN) 5 MG tablet Take 1 tablet by mouth daily 90 tablet 1    warfarin (COUMADIN) 2.5 MG tablet Take 1 tablet by mouth daily 90 tablet 1    omeprazole (PRILOSEC) 20 MG delayed release capsule Take 20 mg by mouth daily      famotidine (PEPCID) 20 MG tablet Take 20 mg by mouth 2 times daily       No current facility-administered medications for this visit.         Last 3 BMP  Lab Results   Component Value Date/Time     01/03/2022 10:05 AM     10/27/2021 10:17 AM     07/07/2017 09:05 AM    K 4.7 01/03/2022 10:05 AM    K 4.7 10/27/2021 10:17 AM    K 4.5 07/14/2020 12:00 AM    K 4.2 06/17/2019 12:00 AM    K 4.0 07/07/2017 09:05 AM     01/03/2022 10:05 AM     (H) 10/27/2021 10:17 AM     07/07/2017 09:05 AM    CO2 22 01/03/2022 10:05 AM    CO2 20 (L) 10/27/2021 10:17 AM    CO2 23 07/07/2017 09:05 AM    BUN 16 01/03/2022 10:05 AM    BUN 18 10/27/2021 10:17 AM    BUN 20 07/07/2017 09:05 AM    CREATININE 1.0 01/03/2022 10:05 AM    CREATININE 0.9 10/27/2021 10:17 AM    CREATININE 0.9 07/14/2020 12:00 AM    CREATININE 0.9 06/17/2019 12:00 AM    CREATININE 1.0 07/07/2017 09:05 AM    GLUCOSE 91 01/03/2022 10:05 AM    GLUCOSE 89 10/27/2021 10:17 AM    GLUCOSE 89 07/07/2017 09:05 AM    GLUCOSE 95 01/27/2012 09:30 AM    GLUCOSE 92 11/10/2010 09:59 AM    CALCIUM 9.7 01/03/2022 10:05 AM    CALCIUM 9.6 10/27/2021 10:17 AM    CALCIUM 9.5 07/07/2017 09:05 AM       Last 3 CMP:    Lab Results   Component Value Date/Time     01/03/2022 10:05 AM     10/27/2021 10:17 AM     07/07/2017 09:05 AM    K 4.7 01/03/2022 10:05 AM    K 4.7 10/27/2021 10:17 AM    K 4.5 07/14/2020 12:00 AM    K 4.2 06/17/2019 12:00 AM    K 4.0 07/07/2017 09:05 AM     01/03/2022 10:05 AM     (H) 10/27/2021 10:17 AM     07/07/2017 09:05 AM    CO2 22 01/03/2022 10:05 AM    CO2 20 (L) 10/27/2021 10:17 AM    CO2 23 07/07/2017 09:05 AM    BUN 16 01/03/2022 10:05 AM    BUN 18 10/27/2021 10:17 AM    BUN 20 07/07/2017 09:05 AM    CREATININE 1.0 01/03/2022 10:05 AM    CREATININE 0.9 10/27/2021 10:17 AM    CREATININE 0.9 07/14/2020 12:00 AM    CREATININE 0.9 06/17/2019 12:00 AM    CREATININE 1.0 07/07/2017 09:05 AM    GLUCOSE 91 01/03/2022 10:05 AM    GLUCOSE 89 10/27/2021 10:17 AM    GLUCOSE 89 07/07/2017 09:05 AM    GLUCOSE 95 01/27/2012 09:30 AM    GLUCOSE 92 11/10/2010 09:59 AM    CALCIUM 9.7 01/03/2022 10:05 AM    CALCIUM 9.6 10/27/2021 10:17 AM    CALCIUM 9.5 07/07/2017 09:05 AM    PROT 7.0 10/27/2021 10:17 AM    PROT 7.0 07/07/2017 09:05 AM    PROT 7.7 10/19/2016 09:15 AM    LABALBU 4.3 10/27/2021 10:17 AM    LABALBU 4.0 07/07/2017 09:05 AM    LABALBU 4.1 10/19/2016 09:15 AM    LABALBU 4.3 01/27/2012 09:30 AM    LABALBU 4.1 11/10/2010 09:59 AM    BILITOT 0.5 10/27/2021 10:17 AM    BILITOT 0.4 07/07/2017 09:05 AM    BILITOT 0.3 10/19/2016 09:15 AM    ALKPHOS 63 10/27/2021 10:17 AM    ALKPHOS 46 07/07/2017 09:05 AM    ALKPHOS 48 10/19/2016 09:15 AM    AST 28 10/27/2021 10:17 AM    AST 23 07/07/2017 09:05 AM    AST 27 10/19/2016 09:15 AM    ALT 13 10/27/2021 10:17 AM    ALT 15 07/07/2017 09:05 AM    ALT 15 10/19/2016 09:15 AM        CBC:   Lab Results   Component Value Date/Time WBC 6.7 10/27/2021 10:17 AM    RBC 4.28 10/27/2021 10:17 AM    HGB 12.9 10/27/2021 10:17 AM    HCT 40.7 10/27/2021 10:17 AM    MCV 95.1 10/27/2021 10:17 AM    MCH 30.1 10/27/2021 10:17 AM    MCHC 31.7 (L) 10/27/2021 10:17 AM    RDW 13.0 10/27/2021 10:17 AM     10/27/2021 10:17 AM    MPV 10.3 10/27/2021 10:17 AM       A1C:  Lab Results   Component Value Date/Time    LABA1C 5.6 10/27/2021 10:17 AM       Lipid panel:  Lab Results   Component Value Date    CHOL 191 10/27/2021    CHOL 196 07/14/2020    CHOL 154 06/17/2019    TRIG 143 10/27/2021    TRIG 132 07/14/2020    TRIG 116 06/17/2019    HDL 52 10/27/2021    HDL 58 07/14/2020    HDL 54 06/17/2019        Lab Results   Component Value Date/Time    PROT 7.0 10/27/2021 10:17 AM    PROT 7.0 07/07/2017 09:05 AM    PROT 7.7 10/19/2016 09:15 AM    INR 4.2 01/07/2022 11:21 AM    INR 3.2 12/06/2021 10:26 AM    INR 2.5 11/05/2021 09:19 AM       No results found for: MG      AssessmentOlga Bland was seen today for knee pain. Diagnoses and all orders for this visit:    Primary osteoarthritis of both knees  -     acetaminophen-codeine (TYLENOL/CODEINE #3) 300-30 MG per tablet; Take 1 tablet by mouth every 8 hours as needed for Pain for up to 30 days. Intended supply: 30 days. Take lowest dose possible to manage pain    Hypertension, unspecified type    Gastroesophageal reflux disease without esophagitis    History of breast cancer    Primary hypertension    Atrial fibrillation, unspecified type ([address]    Other orders  -     POCT INR  -     celecoxib (CELEBREX) 200 MG capsule; Take 1 capsule by mouth daily       Patient Active Problem List   Diagnosis    Hypertension    Other hyperlipidemia    Primary osteoarthritis    Gastroesophageal reflux disease without esophagitis    History of breast cancer    Hyperlipidemia    Glaucoma       Plan: Osteoarthritis both knees.   He had injection that did not help  She is taking 4 Tylenol a day INR slightly elevated today  There still does not help the pain  Tried number three 1 tablet as needed for severe pain    She can try Celebrex 200 mg every other day risks and benefits of medication discussed labs reviewed    Atrial fibrillation INR 4.2 no Coumadin for 2 days then 2.5 mg 5 days a week, and 5 mg 2 days a week check pro time in 2-week    Hypertension controlled continue lisinopril 10 mg    Breast cancer stable    GE reflux disease doing better with  Omeprazole    I will be retiring he will see another physician next visit        No follow-ups on file.        Jose Denson MD  2:32 PM  1/7/2022     [place]

## 2022-01-07 NOTE — TELEPHONE ENCOUNTER
----- Message from Bebe Blake sent at 1/7/2022  2:41 PM EST -----  Subject: Message to Provider    QUESTIONS  Information for Provider? PT has an appt scheduled for 02/07/2022 at 11am   and getting INR rechecked it was high daughter would like to have her   rechecked sooner please. Best contact number [phone]  ---------------------------------------------------------------------------  --------------  Karan VILLALOBOS  What is the best way for the office to contact you? OK to leave message on   voicemail  Preferred Call Back Phone Number? [phone]  ---------------------------------------------------------------------------  --------------  SCRIPT ANSWERS  Relationship to Patient? Other  Representative Name? Rishi Marai   Is the Representative on the appropriate HIPAA document in Epic?  Yes

## 2022-01-21 ENCOUNTER — OFFICE VISIT (OUTPATIENT)
Dept: FAMILY MEDICINE CLINIC | Age: 87
End: 2022-01-21
Payer: MEDICARE

## 2022-01-21 VITALS
BODY MASS INDEX: 27.95 KG/M2 | WEIGHT: 151.9 LBS | HEIGHT: 62 IN | SYSTOLIC BLOOD PRESSURE: 138 MMHG | RESPIRATION RATE: 16 BRPM | DIASTOLIC BLOOD PRESSURE: 72 MMHG | OXYGEN SATURATION: 97 % | HEART RATE: 65 BPM | TEMPERATURE: 98 F

## 2022-01-21 DIAGNOSIS — I10 PRIMARY HYPERTENSION: Primary | ICD-10-CM

## 2022-01-21 DIAGNOSIS — Z23 NEED FOR SHINGLES VACCINE: ICD-10-CM

## 2022-01-21 DIAGNOSIS — M19.91 PRIMARY OSTEOARTHRITIS, UNSPECIFIED SITE: ICD-10-CM

## 2022-01-21 DIAGNOSIS — I49.9 CARDIAC ARRHYTHMIA, UNSPECIFIED CARDIAC ARRHYTHMIA TYPE: ICD-10-CM

## 2022-01-21 DIAGNOSIS — H40.9 GLAUCOMA, UNSPECIFIED GLAUCOMA TYPE, UNSPECIFIED LATERALITY: ICD-10-CM

## 2022-01-21 DIAGNOSIS — E78.49 OTHER HYPERLIPIDEMIA: ICD-10-CM

## 2022-01-21 DIAGNOSIS — E78.5 HYPERLIPIDEMIA, UNSPECIFIED HYPERLIPIDEMIA TYPE: ICD-10-CM

## 2022-01-21 DIAGNOSIS — K21.9 GASTROESOPHAGEAL REFLUX DISEASE WITHOUT ESOPHAGITIS: ICD-10-CM

## 2022-01-21 LAB
INTERNATIONAL NORMALIZATION RATIO, POC: 1.8
PROTHROMBIN TIME, POC: 21.6

## 2022-01-21 PROCEDURE — G0009 ADMIN PNEUMOCOCCAL VACCINE: HCPCS | Performed by: SURGERY

## 2022-01-21 PROCEDURE — 99214 OFFICE O/P EST MOD 30 MIN: CPT | Performed by: SURGERY

## 2022-01-21 PROCEDURE — 90732 PPSV23 VACC 2 YRS+ SUBQ/IM: CPT | Performed by: SURGERY

## 2022-01-21 PROCEDURE — 85610 PROTHROMBIN TIME: CPT | Performed by: SURGERY

## 2022-01-21 RX ORDER — ZOSTER VACCINE RECOMBINANT, ADJUVANTED 50 MCG/0.5
0.5 KIT INTRAMUSCULAR SEE ADMIN INSTRUCTIONS
Qty: 0.5 ML | Refills: 0 | Status: SHIPPED | OUTPATIENT
Start: 2022-01-21 | End: 2022-07-20

## 2022-01-21 NOTE — ASSESSMENT & PLAN NOTE
Follows with Dr. Gaye Retana, EKG obtained in July 2020 demonstrated atrial fibrillation normal rate as interpreted by Dr. Best Seth; however, cardiologist felt this was not definitive. Follow-up EKG demonstrated first-degree AV block in August 2020. She was given a monitor but these results are not available in the medical record system at this point time. We'll have staff reach out to cardiologist office to see with the results of the Holter monitor were. Atria score calculated: 8 points, high risk, greater than 2% annual risk of ischemic stroke. [id number] score of 4, stroke risk is 4.8 %/year and being in a moderate to high risk category suggest the patient is an anticoagulation candidate. Nonetheless, there has not been convincing evidence that this patient has atrial fibrillation. She has been evaluated by cardiology and EKG in their office did not demonstrate atrial fibrillation. Pending Holter monitor results. We will likely remove the Coumadin from the patient at that time if there is no discovery of atrial fibrillation. Has not bled score as noted in HPI. She is at a significant risk for serious adverse event. Will consult with cardiology and get back with patient once we receive the results of the Holter monitor about whether or not we will pull her away from her warfarin. There is no indication inside the chart that the patient has a valvular form of A. fib. If no valvular A. fib but A. fib is present then we will switch to a NOAC. Patient will come on Tuesday for recheck of her INR.  As noted in HPI it seems as though the patient's indulgence and broccoli are Fluorets every 3 weeks might be a good enough explanation for why her INR has been labile, it seems as though anytime we have increased her Coumadin dosing based off of her INR the prior week she had at least 1 package (unsure of weight but it does seem like this would be one of the 16 ounce packages of broccoli Fluorets) and this could explain the higher levels of vitamin K which would decrease the effectiveness of her Coumadin.

## 2022-01-21 NOTE — PATIENT INSTRUCTIONS
Patient Education        Low Sodium Diet (2,000 Milligram): Care Instructions  Overview     Limiting sodium can be an important part of managing some health problems. The most common source of sodium is salt. People get most of the salt in their diet from canned, prepared, and packaged foods. Fast food and restaurant meals also are very high in sodium. Your doctor will probably limit your sodium to less than 2,000 milligrams (mg) a day. This limit counts all the sodium in prepared and packaged foods and any salt you add to your food. Follow-up care is a key part of your treatment and safety. Be sure to make and go to all appointments, and call your doctor if you are having problems. It's also a good idea to know your test results and keep a list of the medicines you take. How can you care for yourself at home? Read food labels  · Read labels on cans and food packages. The labels tell you how much sodium is in each serving. Make sure that you look at the serving size. If you eat more than the serving size, you have eaten more sodium. · Food labels also tell you the Percent Daily Value for sodium. Choose products with low Percent Daily Values for sodium. · Be aware that sodium can come in forms other than salt, including monosodium glutamate (MSG), sodium citrate, and sodium bicarbonate (baking soda). MSG is often added to Asian food. When you eat out, you can sometimes ask for food without MSG or added salt. Buy low-sodium foods  · Buy foods that are labeled \"unsalted\" (no salt added), \"sodium-free\" (less than 5 mg of sodium per serving), or \"low-sodium\" (140 mg or less of sodium per serving). Foods labeled \"reduced-sodium\" and \"light sodium\" may still have too much sodium. Be sure to read the label to see how much sodium you are getting. · Buy fresh vegetables, or frozen vegetables without added sauces. Buy low-sodium versions of canned vegetables, soups, and other canned goods.   Prepare low-sodium meals  · Cut back on the amount of salt you use in cooking. This will help you adjust to the taste. Do not add salt after cooking. One teaspoon of salt has about 2,300 mg of sodium. · Take the salt shaker off the table. · Flavor your food with garlic, lemon juice, onion, vinegar, herbs, and spices. Do not use soy sauce, lite soy sauce, steak sauce, onion salt, garlic salt, celery salt, or ketchup on your food. · Use low-sodium salad dressings, sauces, and ketchup. Or make your own salad dressings and sauces without adding salt. · Use less salt (or none) when recipes call for it. You can often use half the salt a recipe calls for without losing flavor. Other foods such as rice, pasta, and grains do not need added salt. · Rinse canned vegetables, and cook them in fresh water. This removes some--but not all--of the salt. · Avoid water that is naturally high in sodium or that has been treated with water softeners, which add sodium. If you buy bottled water, read the label and choose a sodium-free brand. Avoid high-sodium foods  · Avoid eating:  ? Smoked, cured, salted, and canned meat, fish, and poultry. ? Ham, davidson, hot dogs, and luncheon meats. ? Regular, hard, and processed cheese and regular peanut butter. ? Crackers with salted tops, and other salted snack foods such as pretzels, chips, and salted popcorn. ? Frozen prepared meals, unless labeled low-sodium. ? Canned and dried soups, broths, and bouillon, unless labeled sodium-free or low-sodium. ? Canned vegetables, unless labeled sodium-free or low-sodium. ? [place] fries, pizza, tacos, and other fast foods. ? Pickles, olives, ketchup, and other condiments, especially soy sauce, unless labeled sodium-free or low-sodium. Where can you learn more? Go to https://torito.[email or web address] org and sign in to your [employer] account.  Enter B841 in the [facility] box to learn more about \"Low Sodium Diet (2,000 Milligram): Care Instructions. \"     If you do not have an account, please click on the \"Sign Up Now\" link. Current as of: September 8, 2021               Content Version: 13.1  © 2006-2021 Healthwise, Incorporated. Care instructions adapted under license by [facility] ([facility]). If you have questions about a medical condition or this instruction, always ask your healthcare professional. [address] any warranty or liability for your use of this information. Patient Education        Learning About Cutting Calories  How do calories affect your weight? Food gives your body energy. Energy from the food you eat is measured in calories. This energy keeps your heart beating, your brain active, and your muscles working. Your body needs a certain number of calories each day. After your body uses the calories it needs, it stores extra calories as fat. To lose weight safely, you have to eat fewer calories while eating in a healthy way. How many calories do you need each day? The more active you are, the more calories you need. When you are less active, you need fewer calories. How many calories you need each day also depends on several things, including your age and whether you are male or female. Here are some general guidelines for adults:  · Less active women and older adults need 1,600 to 2,000 calories each day. · Active women and less active men need 2,000 to 2,400 calories each day. · Active men need 2,400 to 3,000 calories each day. How can you cut calories and eat healthy meals? Whole grains, vegetables and fruits, and dried beans are good lower-calorie foods. They give you lots of nutrients and fiber. And they fill you up. Sweets, energy drinks, and soda pop are high in calories. They give you few nutrients and no fiber. Try to limit soda pop, fruit juice, and energy drinks. Drink water instead. Some fats can be part of a healthy diet.  But cutting back on fats from highly processed foods like fast foods and many snack foods is a good way to lower the calories in your diet. Also, use smaller amounts of fats like butter, margarine, salad dressing, and mayonnaise. Add fresh garlic, lemon, or herbs to your meals to add flavor without adding fat. Meats and dairy products can be a big source of hidden fats. Try to choose lean or low-fat versions of these products. Fat-free cookies, candies, chips, and frozen treats can still be high in sugar and calories. Some fat-free foods have more calories than regular ones. Eat fat-free treats in moderation, as you would other foods. If your favorite foods are high in fat, salt, sugar, or calories, limit how often you eat them. Eat smaller servings, or look for healthy substitutes. Fill up on fruits, vegetables, and whole grains. Eating at home  · Use meat as a side dish instead of as the main part of your meal.  · Try main dishes that use whole wheat pasta, brown rice, dried beans, or vegetables. · Find ways to cook with little or no fat, such as broiling, steaming, or grilling. · Use cooking spray instead of oil. If you use oil, use a monounsaturated oil, such as canola or olive oil. · Trim fat from meats before you cook them. · Drain off fat after you brown the meat or while you roast it. · Chill soups and stews after you cook them. Then skim the fat off the top after it hardens. Eating out  · Order foods that are broiled or poached rather than fried or breaded. · Cut back on the amount of butter or margarine that you use on bread. · Order sauces, gravies, and salad dressings on the side, and use only a little. · When you order pasta, choose tomato sauce rather than cream sauce. · Ask for salsa with your baked potato instead of sour cream, butter, cheese, or davidson. · Order meals in a small size instead of upgrading to a large. · Share an entree, or take part of your food home to eat as another meal.  · Share appetizers and desserts.   Where can you learn more?  Go to https://chpepiceweb.[email or web address] org and sign in to your [employer] account. Enter L556 in the [facility] box to learn more about \"Learning About Cutting Calories. \"     If you do not have an account, please click on the \"Sign Up Now\" link. Current as of: September 8, 2021               Content Version: 13.1  © 3891-2960 Healthwise, Incorporated. Care instructions adapted under license by [facility] ([facility]). If you have questions about a medical condition or this instruction, always ask your healthcare professional. [place] [address] any warranty or liability for your use of this information.

## 2022-01-21 NOTE — PROGRESS NOTES
Edwina Cantu (:  1921) is a Kirstin Shilpa [age] y.o. female,Established patient, here for evaluation of the following chief complaint(s):  Establish Care and Anticoagulation (INR Check)         ASSESSMENT/PLAN:  1. Primary hypertension  Assessment & Plan:    information and encouragement for low-sodium diet. Continue current medications. No changes. 2. Gastroesophageal reflux disease without esophagitis  Assessment & Plan:    currently doing lifestyle changes. She will not have any food or drink within 4 hours of laying flat. Continues on her PPI. 3. Primary osteoarthritis, unspecified site  Assessment & Plan:    counseled the patient on the theoretical risk of increased gastrointestinal bleeds being on Celebrex and being on Coumadin at same time. She and her daughter were understanding of these risks and given the fact that her celecoxib medication has improved her subjective pain and improved her functional status we will continue with this medication. 4. Other hyperlipidemia  Assessment & Plan:    dietary counseling. We will review her lipid panel again in 6 months. 5. Hyperlipidemia, unspecified hyperlipidemia type  6. Glaucoma, unspecified glaucoma type, unspecified laterality  Assessment & Plan:    reportedly high normal pressures with her last tonometry, she will continue to follow with ophthalmology for this. No changes to her medications. 7. Cardiac arrhythmia, unspecified cardiac arrhythmia type  Assessment & Plan:    Follows with Dr. Leopold Cowden, EKG obtained in 2020 demonstrated atrial fibrillation normal rate as interpreted by Dr. Kailey Mathis; however, cardiologist felt this was not definitive. Follow-up EKG demonstrated first-degree AV block in 2020. She was given a monitor but these results are not available in the medical record system at this point time. We'll have staff reach out to cardiologist office to see with the results of the Holter monitor were.      Atria score calculated: 8 points, high risk, greater than 2% annual risk of ischemic stroke. [id number] score of 4, stroke risk is 4.8 %/year and being in a moderate to high risk category suggest the patient is an anticoagulation candidate. Nonetheless, there has not been convincing evidence that this patient has atrial fibrillation. She has been evaluated by cardiology and EKG in their office did not demonstrate atrial fibrillation. Pending Holter monitor results. We will likely remove the Coumadin from the patient at that time if there is no discovery of atrial fibrillation. Has not bled score as noted in HPI. She is at a significant risk for serious adverse event. Will consult with cardiology and get back with patient once we receive the results of the Holter monitor about whether or not we will pull her away from her warfarin. There is no indication inside the chart that the patient has a valvular form of A. fib. If no valvular A. fib but A. fib is present then we will switch to a NOAC. Patient will come on Tuesday for recheck of her INR. As noted in HPI it seems as though the patient's indulgence and broccoli are Fluorets every 3 weeks might be a good enough explanation for why her INR has been labile, it seems as though anytime we have increased her Coumadin dosing based off of her INR the prior week she had at least 1 package (unsure of weight but it does seem like this would be one of the 16 ounce packages of broccoli Fluorets) and this could explain the higher levels of vitamin K which would decrease the effectiveness of her Coumadin. After the patient left we received her Holter monitor results from the cardiologist, period of monitoring is from September 23, 2020 to September 29, 2020 and over that period of time it was noted that her baseline is sinus rhythm however she did have multiple episodes of atrial fibrillation without any rapid ventricular response.     We will have the patient discussed with cardiology the potential utility of getting on a novel anticoagulant at that time. In the interim we will leave guidance for when she has her INR rechecked on Tuesday to see what it is. If it continues to remain subtherapeutic we will again adjust to her INR appropriately. Orders:  -     POCT INR  8. Need for shingles vaccine  -     zoster recombinant adjuvanted vaccine [facility]) 50 MCG/0.5ML SUSR injection; Inject 0.5 mLs into the muscle See Admin Instructions 1 dose now and repeat in 2-6 months, Disp-0.5 mL, R-0Normal      Return in about 6 months (around 7/21/2022) for routine checkup, chronic conditions. Subjective   SUBJECTIVE/OBJECTIVE:  HPI:    HTN: Taking medication as prescribed without untoward effect. Denies any headaches, vision changes, tinnitus, epistaxis, chest discomfort, shortness of breath, dyspnea. CBC within normal limits, no evidence of anemia; comprehensive metabolic panel from the same time, within normal limits. Kidney function demonstrates chronic kidney disease stage IIIa, this is stable from prior results. Liver function normal. Lites within normal limits. HLD: Lipid panel in October 2021 demonstrated a near optimal lipid profile, her LDL was elevated at 110. Patient was encouraged  to watch her diet and provided counseling on heart healthy diet. We will recheck again in 6 months and at that point in time recalculate her cardiovascular risk and make any adjustments necessary. Afib:  -Questionable history of atrial fibrillation at this point. EKG at a certain point time was thought to demonstrate this, the cardiologist thought that the patient did not have this, she was ordered a Holter monitor sometime in 2020 and after 5 days of monitoring it was thought that she may have had atrial fibrillation. However, review of the chart does not show any of this information scanned in.   We have put a call out to the cardiologist office in order to obtain these records and obtain his last office visit which was in November 2021.  - HASBLED: 4 points. Risk was 8.9% in one validation study (Lip 2011) and 8.70 bleeds per 100 patient-years in another validation study (Pisters 2010). Alternatives to anticoagulation should be considered: Patient is at high risk for major bleeding. F/u with cardiology in 11/2022. Her coumadin dose has been adjusted many times. She does avoid leafy greens, no spinach. No recent infection. After talking with the patient more it turns out that she loves her broccoli Fluorets and while she does not eat these every day she does periodically go maybe every other week or every third week and goes through a bag of Fluorets. It turns out that she is getting her INR checked shortly after finishing her [facility] through 1 of these bags, it is entirely possible that the vitamin K level that is in the broccoli is suppressing her INR level and then were reflexively trying to increase the medication which would explain some of the times her INR is supratherapeutic. Glaucoma/macular degen:  Follows with new ophthalmologist.  Last visit one month prior, normal pressures. Next appointment 4/11/2022. GERD: long standing, has hiatal hernia. Does well with her medication. No breakthrough. Lifestyle modifications. Arthritis: does well with Tylenol ( 1g in the morning, 1g in evening). Preventative:  -DM/HM reviewed and updated. Vaccinations:  -Due for pneumococcal  -Due for shingles    ROS:       Objective     Denies 10pt ROS other than noted in HPI. PHYSICAL EXAM:    /72   Pulse 65   Temp 98 °F (36.7 °C) (Temporal)   Resp 16   Ht 5' 2\" (1.575 m)   Wt 151 lb 14.4 oz (68.9 kg)   SpO2 97%   BMI 27.78 kg/m²     AVSS    GA: Well-groomed, appears well, no acute distress. HEENT: Atraumatic normocephalic. Extraocular muscles are grossly intact. Pupils are equal round reactive to light.   Conjunctive a pink and moist.  Hearing is grossly intact. Nares patent without external drainage. Buccal mucosa pink and moist.  Posterior oropharynx clear without lesion or exudate. NECK: Trachea is midline, supple, nontender, no lymphadenopathy. CARDIO: Bradycardic at 52 bpm by palpation and auscultation and rhythm is regular without murmur rub or gallop. Cap refill 2+. Radial pulses 2+ bilaterally. RESPIRATORY: Clear to auscultation bilaterally without wheezes rales or rhonchi. Normal inspiratory and expiratory effort. Normoxic on room air    ABD: Rounded, normal active bowel sounds. Soft, nontender, no organomegaly. MSK: Structurally appropriate for age. No gross deficit. Hip flexion knee flexion knee extension 4+ out of 5 bilaterally.  strength is symmetric and rather strong given her age. Clavicles are not prominent. There is some wasting of the major muscle groups. NEURO: Alert, no gross deficit. Cranial nerves II through XII intact without focal deficit. Romberg is negative. SKIN: Generally warm pink and dry. SK present crown of head. On this date 1/21/2022 I have spent 45 minutes reviewing previous notes, test results and face to face with the patient discussing the diagnosis and importance of compliance with the treatment plan as well as documenting on the day of the visit. An electronic signature was used to authenticate this note.     --Juan Hutton, DO

## 2022-01-21 NOTE — ASSESSMENT & PLAN NOTE
counseled the patient on the theoretical risk of increased gastrointestinal bleeds being on Celebrex and being on Coumadin at same time. She and her daughter were understanding of these risks and given the fact that her celecoxib medication has improved her subjective pain and improved her functional status we will continue with this medication.

## 2022-01-21 NOTE — ASSESSMENT & PLAN NOTE
currently doing lifestyle changes. She will not have any food or drink within 4 hours of laying flat. Continues on her PPI.

## 2022-01-21 NOTE — ASSESSMENT & PLAN NOTE
reportedly high normal pressures with her last tonometry, she will continue to follow with ophthalmology for this. No changes to her medications.

## 2022-01-25 ENCOUNTER — NURSE ONLY (OUTPATIENT)
Dept: FAMILY MEDICINE CLINIC | Age: 87
End: 2022-01-25
Payer: MEDICARE

## 2022-01-25 DIAGNOSIS — I48.91 ATRIAL FIBRILLATION, UNSPECIFIED TYPE (HCC): Primary | ICD-10-CM

## 2022-01-25 LAB
INTERNATIONAL NORMALIZATION RATIO, POC: 2.3
PROTHROMBIN TIME, POC: 27.1

## 2022-01-25 PROCEDURE — 93793 ANTICOAG MGMT PT WARFARIN: CPT | Performed by: SURGERY

## 2022-01-25 PROCEDURE — 85610 PROTHROMBIN TIME: CPT | Performed by: SURGERY

## 2022-02-01 ENCOUNTER — NURSE ONLY (OUTPATIENT)
Dept: FAMILY MEDICINE CLINIC | Age: 87
End: 2022-02-01
Payer: MEDICARE

## 2022-02-01 DIAGNOSIS — I49.9 CARDIAC ARRHYTHMIA, UNSPECIFIED CARDIAC ARRHYTHMIA TYPE: Primary | ICD-10-CM

## 2022-02-01 LAB
INTERNATIONAL NORMALIZATION RATIO, POC: 2.5
PROTHROMBIN TIME, POC: 29.8

## 2022-02-01 PROCEDURE — 93793 ANTICOAG MGMT PT WARFARIN: CPT | Performed by: SURGERY

## 2022-02-01 PROCEDURE — 85610 PROTHROMBIN TIME: CPT | Performed by: SURGERY

## 2022-02-01 RX ORDER — CELECOXIB 200 MG/1
200 CAPSULE ORAL DAILY
Qty: 90 CAPSULE | Refills: 3 | Status: SHIPPED
Start: 2022-02-01 | End: 2022-04-18 | Stop reason: SDUPTHER

## 2022-02-01 NOTE — TELEPHONE ENCOUNTER
Last Appointment   1/21/2022  Next Appointment  7/26/2022    Patient is requesting a 90 day supply of Celebrex 200 mg instead of a 30 day supply please.

## 2022-02-15 ENCOUNTER — NURSE ONLY (OUTPATIENT)
Dept: FAMILY MEDICINE CLINIC | Age: 87
End: 2022-02-15
Payer: MEDICARE

## 2022-02-15 DIAGNOSIS — I48.91 ATRIAL FIBRILLATION, UNSPECIFIED TYPE (HCC): Primary | ICD-10-CM

## 2022-02-15 DIAGNOSIS — I49.9 CARDIAC ARRHYTHMIA, UNSPECIFIED CARDIAC ARRHYTHMIA TYPE: ICD-10-CM

## 2022-02-15 LAB
INTERNATIONAL NORMALIZATION RATIO, POC: 2.5
PROTHROMBIN TIME, POC: 30.5

## 2022-02-15 PROCEDURE — 93793 ANTICOAG MGMT PT WARFARIN: CPT | Performed by: SURGERY

## 2022-02-15 PROCEDURE — 85610 PROTHROMBIN TIME: CPT | Performed by: SURGERY

## 2022-03-15 ENCOUNTER — NURSE ONLY (OUTPATIENT)
Dept: FAMILY MEDICINE CLINIC | Age: 87
End: 2022-03-15
Payer: MEDICARE

## 2022-03-15 DIAGNOSIS — I49.9 CARDIAC ARRHYTHMIA, UNSPECIFIED CARDIAC ARRHYTHMIA TYPE: Primary | ICD-10-CM

## 2022-03-15 LAB
INTERNATIONAL NORMALIZATION RATIO, POC: 2.3
PROTHROMBIN TIME, POC: 27.3

## 2022-03-15 PROCEDURE — 85610 PROTHROMBIN TIME: CPT | Performed by: SURGERY

## 2022-04-12 ENCOUNTER — NURSE ONLY (OUTPATIENT)
Dept: FAMILY MEDICINE CLINIC | Age: 87
End: 2022-04-12
Payer: MEDICARE

## 2022-04-12 DIAGNOSIS — I48.91 ATRIAL FIBRILLATION, UNSPECIFIED TYPE (HCC): Primary | ICD-10-CM

## 2022-04-12 LAB
INTERNATIONAL NORMALIZATION RATIO, POC: 1.4
PROTHROMBIN TIME, POC: 17.1

## 2022-04-12 PROCEDURE — 93793 ANTICOAG MGMT PT WARFARIN: CPT | Performed by: SURGERY

## 2022-04-12 PROCEDURE — 85610 PROTHROMBIN TIME: CPT | Performed by: SURGERY

## 2022-04-12 NOTE — PROGRESS NOTES
1.)  Verified complianced, no illness, no change in diet. 2.)  booster dose of 5mg on top of her 2.5mg today  3.)  Return/recheck Monday.

## 2022-04-18 ENCOUNTER — NURSE ONLY (OUTPATIENT)
Dept: FAMILY MEDICINE CLINIC | Age: 87
End: 2022-04-18
Payer: MEDICARE

## 2022-04-18 DIAGNOSIS — I48.91 ATRIAL FIBRILLATION, UNSPECIFIED TYPE (HCC): Primary | ICD-10-CM

## 2022-04-18 DIAGNOSIS — I10 HYPERTENSION, UNSPECIFIED TYPE: ICD-10-CM

## 2022-04-18 LAB
INTERNATIONAL NORMALIZATION RATIO, POC: 1.6
PROTHROMBIN TIME, POC: 18.9

## 2022-04-18 PROCEDURE — 85610 PROTHROMBIN TIME: CPT | Performed by: SURGERY

## 2022-04-18 PROCEDURE — 93793 ANTICOAG MGMT PT WARFARIN: CPT | Performed by: SURGERY

## 2022-04-18 RX ORDER — SIMVASTATIN 20 MG
20 TABLET ORAL NIGHTLY
Qty: 90 TABLET | Refills: 1 | Status: SHIPPED | OUTPATIENT
Start: 2022-04-18

## 2022-04-18 RX ORDER — CELECOXIB 200 MG/1
200 CAPSULE ORAL DAILY
Qty: 90 CAPSULE | Refills: 3 | Status: SHIPPED | OUTPATIENT
Start: 2022-04-18

## 2022-04-18 RX ORDER — LISINOPRIL 10 MG/1
10 TABLET ORAL DAILY
Qty: 90 TABLET | Refills: 1 | Status: SHIPPED | OUTPATIENT
Start: 2022-04-18

## 2022-04-18 RX ORDER — METOPROLOL SUCCINATE 25 MG/1
25 TABLET, EXTENDED RELEASE ORAL DAILY
Qty: 90 TABLET | Refills: 1 | Status: SHIPPED | OUTPATIENT
Start: 2022-04-18

## 2022-04-25 ENCOUNTER — NURSE ONLY (OUTPATIENT)
Dept: FAMILY MEDICINE CLINIC | Age: 87
End: 2022-04-25
Payer: MEDICARE

## 2022-04-25 DIAGNOSIS — I48.91 ATRIAL FIBRILLATION, UNSPECIFIED TYPE (HCC): Primary | ICD-10-CM

## 2022-04-25 LAB
INTERNATIONAL NORMALIZATION RATIO, POC: 2.1
PROTHROMBIN TIME, POC: 25.1

## 2022-04-25 PROCEDURE — 93793 ANTICOAG MGMT PT WARFARIN: CPT | Performed by: SURGERY

## 2022-04-25 PROCEDURE — 85610 PROTHROMBIN TIME: CPT | Performed by: SURGERY

## 2022-05-03 ENCOUNTER — NURSE ONLY (OUTPATIENT)
Dept: FAMILY MEDICINE CLINIC | Age: 87
End: 2022-05-03
Payer: MEDICARE

## 2022-05-03 DIAGNOSIS — I48.91 ATRIAL FIBRILLATION, UNSPECIFIED TYPE (HCC): Primary | ICD-10-CM

## 2022-05-03 LAB
INTERNATIONAL NORMALIZATION RATIO, POC: 2.2
PROTHROMBIN TIME, POC: 25.9

## 2022-05-03 PROCEDURE — 85610 PROTHROMBIN TIME: CPT | Performed by: SURGERY

## 2022-05-03 PROCEDURE — 93793 ANTICOAG MGMT PT WARFARIN: CPT | Performed by: SURGERY

## 2022-05-10 ENCOUNTER — NURSE ONLY (OUTPATIENT)
Dept: FAMILY MEDICINE CLINIC | Age: 87
End: 2022-05-10
Payer: MEDICARE

## 2022-05-10 DIAGNOSIS — I48.91 ATRIAL FIBRILLATION, UNSPECIFIED TYPE (HCC): Primary | ICD-10-CM

## 2022-05-10 LAB
INTERNATIONAL NORMALIZATION RATIO, POC: 2.2
PROTHROMBIN TIME, POC: 26.2

## 2022-05-10 PROCEDURE — 85610 PROTHROMBIN TIME: CPT | Performed by: SURGERY

## 2022-05-10 PROCEDURE — 93793 ANTICOAG MGMT PT WARFARIN: CPT | Performed by: SURGERY

## 2022-06-07 ENCOUNTER — NURSE ONLY (OUTPATIENT)
Dept: FAMILY MEDICINE CLINIC | Age: 87
End: 2022-06-07
Payer: MEDICARE

## 2022-06-07 DIAGNOSIS — I48.91 ATRIAL FIBRILLATION, UNSPECIFIED TYPE (HCC): Primary | ICD-10-CM

## 2022-06-07 LAB
INTERNATIONAL NORMALIZATION RATIO, POC: 1.6
PROTHROMBIN TIME, POC: 19.7

## 2022-06-07 PROCEDURE — 93793 ANTICOAG MGMT PT WARFARIN: CPT | Performed by: SURGERY

## 2022-06-07 PROCEDURE — 85610 PROTHROMBIN TIME: CPT | Performed by: SURGERY

## 2022-06-14 ENCOUNTER — NURSE ONLY (OUTPATIENT)
Dept: FAMILY MEDICINE CLINIC | Age: 87
End: 2022-06-14
Payer: MEDICARE

## 2022-06-14 DIAGNOSIS — I48.91 ATRIAL FIBRILLATION, UNSPECIFIED TYPE (HCC): Primary | ICD-10-CM

## 2022-06-14 LAB
INTERNATIONAL NORMALIZATION RATIO, POC: 2
PROTHROMBIN TIME, POC: 15.6

## 2022-06-14 PROCEDURE — 85610 PROTHROMBIN TIME: CPT | Performed by: SURGERY

## 2022-06-14 PROCEDURE — 93793 ANTICOAG MGMT PT WARFARIN: CPT | Performed by: SURGERY

## 2022-06-14 NOTE — PROGRESS NOTES
Need to have 2 to 3 weekly checks in a row that are in a therapeutic range and then we can switch to checking monthly again.

## 2022-06-21 ENCOUNTER — NURSE ONLY (OUTPATIENT)
Dept: FAMILY MEDICINE CLINIC | Age: 87
End: 2022-06-21
Payer: MEDICARE

## 2022-06-21 DIAGNOSIS — I48.91 ATRIAL FIBRILLATION, UNSPECIFIED TYPE (HCC): Primary | ICD-10-CM

## 2022-06-21 LAB
INTERNATIONAL NORMALIZATION RATIO, POC: 2.8
PROTHROMBIN TIME, POC: 30.5

## 2022-06-21 PROCEDURE — 93793 ANTICOAG MGMT PT WARFARIN: CPT | Performed by: SURGERY

## 2022-06-21 PROCEDURE — 85610 PROTHROMBIN TIME: CPT | Performed by: SURGERY

## 2022-07-19 ENCOUNTER — NURSE ONLY (OUTPATIENT)
Dept: FAMILY MEDICINE CLINIC | Age: 87
End: 2022-07-19
Payer: MEDICARE

## 2022-07-19 DIAGNOSIS — I48.91 ATRIAL FIBRILLATION, UNSPECIFIED TYPE (HCC): Primary | ICD-10-CM

## 2022-07-19 LAB
INTERNATIONAL NORMALIZATION RATIO, POC: 2.1
PROTHROMBIN TIME, POC: 25.6

## 2022-07-19 PROCEDURE — 93793 ANTICOAG MGMT PT WARFARIN: CPT | Performed by: SURGERY

## 2022-07-19 PROCEDURE — 85610 PROTHROMBIN TIME: CPT | Performed by: SURGERY

## 2022-07-27 ENCOUNTER — OFFICE VISIT (OUTPATIENT)
Dept: FAMILY MEDICINE CLINIC | Age: 87
End: 2022-07-27
Payer: MEDICARE

## 2022-07-27 VITALS
RESPIRATION RATE: 16 BRPM | HEART RATE: 60 BPM | HEIGHT: 62 IN | TEMPERATURE: 97.8 F | SYSTOLIC BLOOD PRESSURE: 128 MMHG | WEIGHT: 150 LBS | OXYGEN SATURATION: 99 % | BODY MASS INDEX: 27.6 KG/M2 | DIASTOLIC BLOOD PRESSURE: 64 MMHG

## 2022-07-27 DIAGNOSIS — M17.0 PRIMARY OSTEOARTHRITIS OF BOTH KNEES: ICD-10-CM

## 2022-07-27 DIAGNOSIS — Z23 NEED FOR SHINGLES VACCINE: Primary | ICD-10-CM

## 2022-07-27 DIAGNOSIS — I10 ESSENTIAL HYPERTENSION: ICD-10-CM

## 2022-07-27 DIAGNOSIS — K21.9 GASTROESOPHAGEAL REFLUX DISEASE WITHOUT ESOPHAGITIS: ICD-10-CM

## 2022-07-27 DIAGNOSIS — H40.9 GLAUCOMA, UNSPECIFIED GLAUCOMA TYPE, UNSPECIFIED LATERALITY: ICD-10-CM

## 2022-07-27 DIAGNOSIS — I48.91 ATRIAL FIBRILLATION, UNSPECIFIED TYPE (HCC): ICD-10-CM

## 2022-07-27 PROCEDURE — 1123F ACP DISCUSS/DSCN MKR DOCD: CPT | Performed by: SURGERY

## 2022-07-27 PROCEDURE — 99215 OFFICE O/P EST HI 40 MIN: CPT | Performed by: SURGERY

## 2022-07-27 RX ORDER — ZOSTER VACCINE RECOMBINANT, ADJUVANTED 50 MCG/0.5
0.5 KIT INTRAMUSCULAR SEE ADMIN INSTRUCTIONS
Qty: 0.5 ML | Refills: 0 | Status: SHIPPED | OUTPATIENT
Start: 2022-07-27 | End: 2023-01-23

## 2022-07-27 ASSESSMENT — PATIENT HEALTH QUESTIONNAIRE - PHQ9
SUM OF ALL RESPONSES TO PHQ QUESTIONS 1-9: 0
SUM OF ALL RESPONSES TO PHQ9 QUESTIONS 1 & 2: 0
SUM OF ALL RESPONSES TO PHQ QUESTIONS 1-9: 0
1. LITTLE INTEREST OR PLEASURE IN DOING THINGS: 0
2. FEELING DOWN, DEPRESSED OR HOPELESS: 0

## 2022-07-27 NOTE — PROGRESS NOTES
Edwina Cantu (:  1921) is a 80 y.o. female,Established patient, here for evaluation of the following chief complaint(s):  Follow-up (6 month follow up) and Health Maintenance (Due for Tdap, Shingles)         ASSESSMENT/PLAN:  1. Need for shingles vaccine  -     zoster recombinant adjuvanted vaccine [facility]) 50 MCG/0.5ML SUSR injection; Inject 0.5 mLs into the muscle See Admin Instructions 1 dose now and repeat in 2-6 months, Disp-0.5 mL, R-0Print    Return in about 4 months (around 2022) for AWV. Subjective   SUBJECTIVE/OBJECTIVE:  HPI:    HTN: [Taking medication as prescribed without untoward effect. Denies any headaches, vision changes, tinnitus, epistaxis, chest discomfort, shortness of breath, dyspnea. CBC within normal limits, no evidence of anemia; comprehensive metabolic panel from the same time, within normal limits. Kidney function demonstrates chronic kidney disease stage IIIa, this is stable from prior results. Liver function normal. Lites within normal limits. ]  -waiting on labs  -but otherwise normotensive      HLD: [Lipid panel in 2021 demonstrated a near optimal lipid profile, her LDL was elevated at 110. Patient was encouraged  to watch her diet and provided counseling on heart healthy diet. We will recheck again in 6 months and at that point in time recalculate her cardiovascular risk and make any adjustments necessary. ]  -did not get labs       Afib:  - HASBLED: 4 points. Risk was 8.9% in one validation study (Lip 2011) and 8.70 bleeds per 100 patient-years in another validation study (Pisters 2010). Alternatives to anticoagulation should be considered: Patient is at high risk for major bleeding. F/u with cardiology in 2022. Stable INR now. Glaucoma/macular degen:  Follows with new ophthalmologist.  Last visit one month prior, normal pressures.   Next appointment 2022.  -no new changes to her eye drop regimen   -ocular tonometry are perfect according to daughter     GERD: long standing, has hiatal hernia. Does well with her medication. No breakthrough. Lifestyle modifications.  -does not lay flat for about 4 hours      Arthritis: does well with Tylenol ( 1g in the morning, 1g in evening). -Celebrex  -understands risks and benefits      Preventative:  Health Maintenance   Topic Date Due    Shingles vaccine (2 of 3) 05/29/2013    DTaP/Tdap/Td vaccine (1 - Tdap) 07/27/2023 (Originally 9/14/1940)    Flu vaccine (1) 09/01/2022    Lipids  10/27/2022    Depression Screen  11/05/2022    Annual Wellness Visit (AWV)  11/06/2022    Pneumococcal 65+ years Vaccine  Completed    COVID-19 Vaccine  Completed    Hepatitis A vaccine  Aged Out    Hepatitis B vaccine  Aged Out    Hib vaccine  Aged Out    Meningococcal (ACWY) vaccine  Aged Out           ROS:    Denies 10pt ROS other than noted in HPI. Objective       PHYSICAL EXAM:    /64   Pulse 60   Temp 97.8 °F (36.6 °C) (Temporal)   Resp 16   Ht 5' 2\" (1.575 m)   Wt 150 lb (68 kg)   SpO2 99%   BMI 27.44 kg/m²     AVSS    GA: Well-groomed, appears well, no acute distress. HEENT: Atraumatic normocephalic. Extraocular muscles are grossly intact. Pupils are equal round reactive to light. Conjunctiva pink and moist.  Hearing is grossly intact. Nares patent without external drainage. Buccal mucosa pink and moist.  Posterior oropharynx clear without lesion or exudate. NECK: Trachea is midline, supple, nontender, no lymphadenopathy. CARDIO: Regular rate and rhythm without murmur rub or gallop. Cap refill 2+. Radial pulses 2+ bilaterally. RESPIRATORY: Clear to auscultation bilaterally without wheezes rales or rhonchi. Normal inspiratory and expiratory effort. Normoxic on room air    ABD: Rounded, normoactive bowel sounds. Soft, nontender, no organomegaly. MSK: Structurally appropriate for age. No gross deficit. NEURO: Alert, no gross deficit.     PSYCH:  Mood is normal and congruent with affect. No signs of psychomotor retardation or agitation. Thought content seems normal, speech is fluent and non-pressured. SKIN: Generally warm pink and dry. On this date 7/27/2022 I have spent 40 minutes reviewing previous notes, test results and face to face with the patient discussing the diagnosis and importance of compliance with the treatment plan as well as documenting on the day of the visit. An electronic signature was used to authenticate this note.     --Vito Mcmahon, DO

## 2022-08-16 ENCOUNTER — NURSE ONLY (OUTPATIENT)
Dept: FAMILY MEDICINE CLINIC | Age: 87
End: 2022-08-16
Payer: MEDICARE

## 2022-08-16 DIAGNOSIS — I48.91 ATRIAL FIBRILLATION, UNSPECIFIED TYPE (HCC): Primary | ICD-10-CM

## 2022-08-16 LAB
INTERNATIONAL NORMALIZATION RATIO, POC: 2.5
PROTHROMBIN TIME, POC: 30.1

## 2022-08-16 PROCEDURE — 85610 PROTHROMBIN TIME: CPT | Performed by: SURGERY

## 2022-09-13 ENCOUNTER — NURSE ONLY (OUTPATIENT)
Dept: FAMILY MEDICINE CLINIC | Age: 87
End: 2022-09-13
Payer: MEDICARE

## 2022-09-13 DIAGNOSIS — I48.91 ATRIAL FIBRILLATION, UNSPECIFIED TYPE (HCC): Primary | ICD-10-CM

## 2022-09-13 LAB
INTERNATIONAL NORMALIZATION RATIO, POC: 2.1
PROTHROMBIN TIME, POC: 25.5

## 2022-09-13 PROCEDURE — 85610 PROTHROMBIN TIME: CPT | Performed by: SURGERY

## 2022-09-13 PROCEDURE — 93793 ANTICOAG MGMT PT WARFARIN: CPT | Performed by: SURGERY

## 2022-09-21 RX ORDER — WARFARIN SODIUM 2.5 MG/1
2.5 TABLET ORAL DAILY
Qty: 90 TABLET | Refills: 1 | Status: SHIPPED | OUTPATIENT
Start: 2022-09-21

## 2022-10-11 ENCOUNTER — NURSE ONLY (OUTPATIENT)
Dept: FAMILY MEDICINE CLINIC | Age: 87
End: 2022-10-11
Payer: MEDICARE

## 2022-10-11 DIAGNOSIS — I48.91 ATRIAL FIBRILLATION, UNSPECIFIED TYPE (HCC): Primary | ICD-10-CM

## 2022-10-11 LAB
INTERNATIONAL NORMALIZATION RATIO, POC: 2.6
PROTHROMBIN TIME, POC: 31.6

## 2022-10-11 PROCEDURE — 93793 ANTICOAG MGMT PT WARFARIN: CPT | Performed by: SURGERY

## 2022-10-11 PROCEDURE — 85610 PROTHROMBIN TIME: CPT | Performed by: SURGERY

## 2022-11-08 ENCOUNTER — NURSE ONLY (OUTPATIENT)
Dept: FAMILY MEDICINE CLINIC | Age: 87
End: 2022-11-08
Payer: MEDICARE

## 2022-11-08 DIAGNOSIS — I48.91 ATRIAL FIBRILLATION, UNSPECIFIED TYPE (HCC): Primary | ICD-10-CM

## 2022-11-08 LAB
INTERNATIONAL NORMALIZATION RATIO, POC: 2.8
PROTHROMBIN TIME, POC: 33.6

## 2022-11-08 PROCEDURE — 93793 ANTICOAG MGMT PT WARFARIN: CPT | Performed by: SURGERY

## 2022-11-08 PROCEDURE — 85610 PROTHROMBIN TIME: CPT | Performed by: SURGERY

## 2022-12-06 ENCOUNTER — OFFICE VISIT (OUTPATIENT)
Dept: FAMILY MEDICINE CLINIC | Age: 87
End: 2022-12-06
Payer: MEDICARE

## 2022-12-06 VITALS
DIASTOLIC BLOOD PRESSURE: 74 MMHG | SYSTOLIC BLOOD PRESSURE: 128 MMHG | OXYGEN SATURATION: 99 % | WEIGHT: 145.8 LBS | TEMPERATURE: 97.8 F | HEIGHT: 62 IN | HEART RATE: 70 BPM | RESPIRATION RATE: 16 BRPM | BODY MASS INDEX: 26.83 KG/M2

## 2022-12-06 DIAGNOSIS — I48.91 ATRIAL FIBRILLATION, UNSPECIFIED TYPE (HCC): Primary | ICD-10-CM

## 2022-12-06 DIAGNOSIS — E78.5 HYPERLIPIDEMIA, UNSPECIFIED HYPERLIPIDEMIA TYPE: ICD-10-CM

## 2022-12-06 DIAGNOSIS — Z00.00 MEDICARE ANNUAL WELLNESS VISIT, SUBSEQUENT: ICD-10-CM

## 2022-12-06 DIAGNOSIS — I10 HYPERTENSION, UNSPECIFIED TYPE: ICD-10-CM

## 2022-12-06 DIAGNOSIS — M17.12 OSTEOARTHRITIS OF LEFT KNEE, UNSPECIFIED OSTEOARTHRITIS TYPE: ICD-10-CM

## 2022-12-06 DIAGNOSIS — I10 HYPERTENSION, UNSPECIFIED TYPE: Primary | ICD-10-CM

## 2022-12-06 DIAGNOSIS — Z79.01 ANTICOAGULATED: ICD-10-CM

## 2022-12-06 LAB
INTERNATIONAL NORMALIZATION RATIO, POC: 2.2
PROTHROMBIN TIME, POC: 26.6

## 2022-12-06 PROCEDURE — 1123F ACP DISCUSS/DSCN MKR DOCD: CPT | Performed by: SURGERY

## 2022-12-06 PROCEDURE — 85610 PROTHROMBIN TIME: CPT | Performed by: SURGERY

## 2022-12-06 PROCEDURE — G0439 PPPS, SUBSEQ VISIT: HCPCS | Performed by: SURGERY

## 2022-12-06 RX ORDER — LATANOPROST 50 UG/ML
SOLUTION/ DROPS OPHTHALMIC
COMMUNITY
Start: 2022-10-23

## 2022-12-06 RX ORDER — CELECOXIB 200 MG/1
200 CAPSULE ORAL DAILY
Qty: 90 CAPSULE | Refills: 3 | Status: CANCELLED | OUTPATIENT
Start: 2022-12-06

## 2022-12-06 RX ORDER — SIMVASTATIN 20 MG
20 TABLET ORAL NIGHTLY
Qty: 90 TABLET | Refills: 1 | Status: SHIPPED | OUTPATIENT
Start: 2022-12-06

## 2022-12-06 RX ORDER — LISINOPRIL 10 MG/1
10 TABLET ORAL DAILY
Qty: 90 TABLET | Refills: 1 | Status: SHIPPED | OUTPATIENT
Start: 2022-12-06

## 2022-12-06 RX ORDER — WARFARIN SODIUM 2.5 MG/1
2.5 TABLET ORAL DAILY
Qty: 90 TABLET | Refills: 1 | Status: SHIPPED | OUTPATIENT
Start: 2022-12-06

## 2022-12-06 RX ORDER — BRIMONIDINE TARTRATE 0.15 %
DROPS OPHTHALMIC (EYE)
COMMUNITY
Start: 2022-11-23

## 2022-12-06 RX ORDER — METOPROLOL SUCCINATE 25 MG/1
25 TABLET, EXTENDED RELEASE ORAL DAILY
Qty: 90 TABLET | Refills: 1 | Status: SHIPPED | OUTPATIENT
Start: 2022-12-06

## 2022-12-06 RX ORDER — DORZOLAMIDE HCL 20 MG/ML
SOLUTION/ DROPS OPHTHALMIC
COMMUNITY
Start: 2022-10-02

## 2022-12-06 RX ORDER — TIMOLOL MALEATE 5 MG/ML
SOLUTION/ DROPS OPHTHALMIC
COMMUNITY
Start: 2022-10-02

## 2022-12-06 RX ORDER — WARFARIN SODIUM 5 MG/1
5 TABLET ORAL DAILY
Qty: 90 TABLET | Refills: 1 | Status: SHIPPED | OUTPATIENT
Start: 2022-12-06

## 2022-12-06 ASSESSMENT — PATIENT HEALTH QUESTIONNAIRE - PHQ9
1. LITTLE INTEREST OR PLEASURE IN DOING THINGS: 0
SUM OF ALL RESPONSES TO PHQ QUESTIONS 1-9: 1
SUM OF ALL RESPONSES TO PHQ QUESTIONS 1-9: 1
2. FEELING DOWN, DEPRESSED OR HOPELESS: 1
SUM OF ALL RESPONSES TO PHQ QUESTIONS 1-9: 1
SUM OF ALL RESPONSES TO PHQ9 QUESTIONS 1 & 2: 1
SUM OF ALL RESPONSES TO PHQ QUESTIONS 1-9: 1

## 2022-12-06 ASSESSMENT — LIFESTYLE VARIABLES
HOW OFTEN DO YOU HAVE A DRINK CONTAINING ALCOHOL: NEVER
HOW MANY STANDARD DRINKS CONTAINING ALCOHOL DO YOU HAVE ON A TYPICAL DAY: PATIENT DOES NOT DRINK

## 2022-12-06 NOTE — PATIENT INSTRUCTIONS
Advance Directives: Care Instructions  Overview  An advance directive is a legal way to state your wishes at the end of your life. It tells your family and your doctor what to do if you can't say what you want. There are two main types of advance directives. You can change them any time your wishes change. Living will. This form tells your family and your doctor your wishes about life support and other treatment. The form is also called a declaration. Medical power of . This form lets you name a person to make treatment decisions for you when you can't speak for yourself. This person is called a health care agent (health care proxy, health care surrogate). The form is also called a durable power of  for health care. If you do not have an advance directive, decisions about your medical care may be made by a family member, or by a doctor or a  who doesn't know you. It may help to think of an advance directive as a gift to the people who care for you. If you have one, they won't have to make tough decisions by themselves. Follow-up care is a key part of your treatment and safety. Be sure to make and go to all appointments, and call your doctor if you are having problems. It's also a good idea to know your test results and keep a list of the medicines you take. What should you include in an advance directive? Many states have a unique advance directive form. (It may ask you to address specific issues.) Or you might use a universal form that's approved by many states. If your form doesn't tell you what to address, it may be hard to know what to include in your advance directive. Use the questions below to help you get started. Who do you want to make decisions about your medical care if you are not able to? What life-support measures do you want if you have a serious illness that gets worse over time or can't be cured? What are you most afraid of that might happen?  (Maybe you're afraid of having pain, losing your independence, or being kept alive by machines.)  Where would you prefer to die? (Your home? A hospital? A nursing home?)  Do you want to donate your organs when you die? Do you want certain Protestant practices performed before you die? When should you call for help? Be sure to contact your doctor if you have any questions. Where can you learn more? Go to [email or web address] and sign in to your G[employer] account. Enter R264 in the [employer] box to learn more about \"Advance Directives: Care Instructions. \"     If you do not have an account, please click on the \"Sign Up Now\" link. Current as of: June 16, 2022               Content Version: 13.4  © 1187-6945 [employer]. Care instructions adapted under license by [facility] ([facility]). If you have questions about a medical condition or this instruction, always ask your healthcare professional. [place] [address] any warranty or liability for your use of this information. Learning About Medical Power of   What is a medical power of ? A medical power of , also called a durable power of  for health care, is one type of the legal forms called advance directives. It lets you name the person you want to make treatment decisions for you if you can't speak or decide for yourself. The person you choose is called your health care agent. This person is also called a health care proxy or health care surrogate. A medical power of  may be called something else in your state. How do you choose a health care agent? Choose your health care agent carefully. This person may or may not be a family member. Talk to the person before you make your final decision. Make sure he or she is comfortable with this responsibility. It's a good idea to choose someone who:  Is at least 25years old.   Knows you well and understands what makes life meaningful for you. Understands your Mormon and moral values. Will do what you want, not what he or she wants. Will be able to make difficult choices at a stressful time. Will be able to refuse or stop treatment, if that is what you would want, even if you could die. Will be firm and confident with health professionals if needed. Will ask questions to get needed information. Lives near you or agrees to travel to you if needed. Your family may help you make medical decisions while you can still be part of that process. But it's important to choose one person to be your health care agent in case you aren't able to make decisions for yourself. If you don't fill out the legal form and name a health care agent, the decisions your family can make may be limited. A health care agent may be called something else in your state. Who will make decisions for you if you don't have a health care agent? If you don't have a health care agent or a living will, you may not get the care you want. Decisions may be made by family members who disagree about your medical care. Or decisions may be made by a medical professional who doesn't know you well. In some cases, a  makes the decisions. When you name a health care agent, it is very clear who has the power to make health decisions for you. How do you name a health care agent? You name your health care agent on a legal form. This form is usually called a medical power of . Ask your hospital, state bar association, or office on aging where to find these forms. You must sign the form to make it legal. Some states require you to get the form notarized. This means that a person called a  watches you sign the form and then he or she signs the form. Some states also require that two or more witnesses sign the form. Be sure to tell your family members and doctors who your health care agent is. Where can you learn more?   Go to [email or web address] and sign in to your [employer] account. Enter [id number] in the [facility] box to learn more about \"Learning About [address]. \"     If you do not have an account, please click on the \"Sign Up Now\" link. Current as of: October 6, 2021               Content Version: 13.4  © 2006-2022 Healthwise, [employer]. Care instructions adapted under license by [facility] ([facility]). If you have questions about a medical condition or this instruction, always ask your healthcare professional. [address] any warranty or liability for your use of this information. Learning About Living [place]  What is a living will? A living will, also called a declaration, is a legal form. It tells your family and your doctor your wishes when you can't speak for yourself. It's used by the health professionals who will treat you as you near the end of your life or if you get seriously hurt or ill. If you put your wishes in writing, your loved ones and others will know what kind of care you want. They won't need to guess. This can ease your mind and be helpful to others. And you can change or cancel your living will at any time. A living will is not the same as an estate or property will. An estate will explains what you want to happen with your money and property after you die. How do you use it? Keep these facts in mind about how a living will is used. Your living will is used only if you can't speak or make decisions for yourself. Most often, one or more doctors must certify that you can't speak or decide for yourself before your living will takes effect. If you get better and can speak for yourself again, you can accept or refuse any treatment. It doesn't matter what you said in your living will. Some states may limit your right to refuse treatment in certain cases.  For example, you may need to clearly state in your living will that you don't want artificial hydration and nutrition, such as being fed through a tube. Is a living will a legal document? A living will is a legal document. Each state has its own laws about living weathers. And a living will may be called something else in your state. Here are some things to know about living weathers. You don't need an  to complete a living will. But legal advice can be helpful if your state's laws are unclear. It can also help if your health history is complicated or your family can't agree on what should be in your living will. You can change your living will at any time. Some people find that their wishes about end-of-life care change as their health changes. If you make big changes to your living will, complete a new form. If you move to another state, make sure that your living will is legal in the state where you now live. In most cases, doctors will respect your wishes even if you have a form from a different state. You might use a universal form that has been approved by many states. This kind of form can sometimes be filled out and stored online. Your digital copy will then be available wherever you have a connection to the internet. The doctors and nurses who need to treat you can find it right away. Your state may offer an online registry. This is another place where you can store your living will online. It's a good idea to get your living will notarized. This means using a person called a  to watch two people sign, or witness, your living will. What should you know when you create a living will? Here are some questions to ask yourself as you make your living will. Do you know enough about life support methods that might be used? If not, talk to your doctor so you know what might be done if you can't breathe on your own, your heart stops, or you can't swallow. What things would you still want to be able to do after you receive life-support methods?  Would you want to be able to walk? To speak? To eat on your own? To live without the help of machines? Do you want certain Nondenominational practices performed if you become very ill? If you have a choice, where do you want to be cared for? In your home? At a hospital or nursing home? If you have a choice at the end of your life, where would you prefer to die? At home? In a hospital or nursing home? Somewhere else? Would you prefer to be buried or cremated? Do you want your organs to be donated after you die? What should you do with your living will? Make sure that your family members and your health care agent have copies of your living will (also called a declaration). Give your doctor a copy of your living will. Ask to have it kept as part of your medical record. If you have more than one doctor, make sure that each one has a copy. Put a copy of your living will where it can be easily found. For example, some people may put a copy on their refrigerator door. If you are using a digital copy, be sure your doctor, family members, and health care agent know how to find and access it. Where can you learn more? Go to [email or web address] and sign in to your [employer] account. Enter A630 in the [employer] box to learn more about \"Learning About Living [place]. \"     If you do not have an account, please click on the \"Sign Up Now\" link. Current as of: June 16, 2022               Content Version: 13.4  © 2006-2022 Healthwise, Incorporated. Care instructions adapted under license by [facility] ([facility]). If you have questions about a medical condition or this instruction, always ask your healthcare professional. [place] [address] any warranty or liability for your use of this information. Personalized Preventive Plan for Milly Cruz - 12/6/2022  Medicare offers a range of preventive health benefits.  Some of the tests and screenings are paid in full while other may be subject to a deductible, co-insurance, and/or copay. Some of these benefits include a comprehensive review of your medical history including lifestyle, illnesses that may run in your family, and various assessments and screenings as appropriate. After reviewing your medical record and screening and assessments performed today your provider may have ordered immunizations, labs, imaging, and/or referrals for you. A list of these orders (if applicable) as well as your Preventive Care list are included within your After Visit Summary for your review. Other Preventive Recommendations:    A preventive eye exam performed by an eye specialist is recommended every 1-2 years to screen for glaucoma; cataracts, macular degeneration, and other eye disorders. A preventive dental visit is recommended every 6 months. Try to get at least 150 minutes of exercise per week or 10,000 steps per day on a pedometer . Order or download the FREE \"Exercise & Physical Activity: Your Everyday Guide\" from [employer]. Call 3-380.604.4873 or search [employer] online. You need 9601-0530 mg of calcium and 7414-1842 IU of vitamin D per day. It is possible to meet your calcium requirement with diet alone, but a vitamin D supplement is usually necessary to meet this goal.  When exposed to the sun, use a sunscreen that protects against both UVA and UVB radiation with an SPF of 30 or greater. Reapply every 2 to 3 hours or after sweating, drying off with a towel, or swimming. Always wear a seat belt when traveling in a car. Always wear a helmet when riding a bicycle or motorcycle.

## 2022-12-06 NOTE — PROGRESS NOTES
Medicare Annual Wellness Visit    [place] is here for Medicare AWV and Health Maintenance (Due for Shingles Vaccine,Covid Vaccine)    Assessment & Plan   Atrial fibrillation, unspecified type ([address]  -     POCT INR  -     warfarin (COUMADIN) 2.5 MG tablet; Take 1 tablet by mouth daily, Disp-90 tablet, R-1Normal  -     warfarin (COUMADIN) 5 MG tablet; Take 1 tablet by mouth daily, Disp-90 tablet, R-1Normal  Hypertension, unspecified type  -     lisinopril (PRINIVIL;ZESTRIL) 10 MG tablet; Take 1 tablet by mouth daily, Disp-90 tablet, R-1Normal  -     metoprolol succinate (TOPROL XL) 25 MG extended release tablet; Take 1 tablet by mouth daily, Disp-90 tablet, R-1Normal  Medicare annual wellness visit, subsequent  All personal, family, social, surgical, medical history is reviewed and updated with patient. Allergies, medications updated. List of specialists follows with updated. DM/HM updated. Hyperlipidemia, unspecified hyperlipidemia type  -     simvastatin (ZOCOR) 20 MG tablet; Take 1 tablet by mouth nightly, Disp-90 tablet, R-1Normal  Osteoarthritis of left knee, unspecified osteoarthritis type  Anticoagulated  -     warfarin (COUMADIN) 2.5 MG tablet; Take 1 tablet by mouth daily, Disp-90 tablet, R-1Normal  -     warfarin (COUMADIN) 5 MG tablet; Take 1 tablet by mouth daily, Disp-90 tablet, R-1Normal      Recommendations for Preventive Services Due: see orders and patient instructions/AVS.  Recommended screening schedule for the next 5-10 years is provided to the patient in written form: see Patient Instructions/AVS.     Return in 6 months (on 6/6/2023) for Medicare Annual Wellness Visit in 1 year, chronic 30 min . Subjective       Patient's complete Health Risk Assessment and screening values have been reviewed and are found in Flowsheets. The following problems were reviewed today and where indicated follow up appointments were made and/or referrals ordered.     Positive Risk Factor Screenings with Interventions:             General Health and ACP:  General  In general, how would you say your health is?: Good  In the past 7 days, have you experienced any of the following: New or Increased Pain, New or Increased Fatigue, Loneliness, Social Isolation, Stress or Anger?: (!) Yes  Select all that apply: (!) Loneliness  Do you get the social and emotional support that you need?: Yes - this is inaccurate, she has friends, daily interactions with others. .. only periodic loneliness. Advance Directives       Power of  [facility] ACP-Advance Directive ACP-Power of     Not on File Not on File Not on File Not on File          General Health Risk Interventions:  Loneliness: patient declines any further intervention for this issue . .. Health Habits/Nutrition:  Physical Activity: Inactive    Days of Exercise per Week: 0 days    Minutes of Exercise per Session: 0 min     Have you lost any weight without trying in the past 3 months?: No  Body mass index: (!) 26.66  Have you seen the dentist within the past year?: (!) No    Health Habits/Nutrition Interventions:  Dental exam overdue:  patient encouraged to make appointment with his/her dentist    Hearing/Vision:  Do you or your family notice any trouble with your hearing that hasn't been managed with hearing aids?: No  Do you have difficulty driving, watching TV, or doing any of your daily activities because of your eyesight?: (!) Yes  Have you had an eye exam within the past year?: Yes  No results found. Hearing/Vision Interventions:  Vision concerns:  patient encouraged to make appointment with his/her eye specialist  - she gets routine eye exams.     Safety:  Do you have working smoke detectors?: Yes  Do you have any tripping hazards - loose or unsecured carpets or rugs?: (!) Yes  Do you have any tripping hazards - clutter in doorways, halls, or stairs?: No  Do you have either shower bars, grab bars, non-slip mats or non-slip surfaces in your shower or bathtub?: Yes  Do all of your stairways have a railing or banister?: Yes  Do you always fasten your seatbelt when you are in a car?: Yes    Safety Interventions:  Home safety tips provided    ADLs:  In the past 7 days, did you need help from others to perform any of the following everyday activities: Eating, dressing, grooming, bathing, toileting, or walking/balance?: No  In the past 7 days, did you need help from others to take care of any of the following: Laundry, housekeeping, banking/finances, shopping, telephone use, food preparation, transportation, or taking medications?: (!) Yes  Select all that apply: [employer], Housekeeping, Banking/Finances, Shopping    ADL Interventions:  Patient declines any further evaluation/treatment for this issue  . .. she has a strong support network with her children           Objective   Vitals:    12/06/22 1415   BP: 128/74   Pulse: 70   Resp: 16   Temp: 97.8 °F (36.6 °C)   TempSrc: Temporal   SpO2: 99%   Weight: 145 lb 12.8 oz (66.1 kg)   Height: 5' 2\" (1.575 m)      Body mass index is 26.67 kg/m². No Known Allergies  Prior to Visit Medications    Medication Sig Taking?  Authorizing Provider   brimonidine (ALPHAGAN P) 0.15 % ophthalmic solution INSTILL 1 DROP INTO EACH EYE TWICE DAILY Yes Historical Provider, MD   dorzolamide (TRUSOPT) 2 % ophthalmic solution INSTILL 1 DROP INTO EACH EYE TWICE DAILY Yes Historical Provider, MD   latanoprost (XALATAN) 0.005 % ophthalmic solution INSTILL 1 DROP INTO EACH EYE ONCE DAILY Yes Historical Provider, MD   timolol (TIMOPTIC) 0.5 % ophthalmic solution INSTILL 1 DROP INTO EACH EYE TWICE DAILY Yes Historical Provider, MD   lisinopril (PRINIVIL;ZESTRIL) 10 MG tablet Take 1 tablet by mouth daily Yes Liya Sorenson DO   metoprolol succinate (TOPROL XL) 25 MG extended release tablet Take 1 tablet by mouth daily Yes Liya Sorenson DO   simvastatin (ZOCOR) 20 MG tablet Take 1 tablet by mouth nightly Yes Liya Sorenson DO   warfarin (COUMADIN) 2.5 MG tablet Take 1 tablet by mouth daily Yes Niki Ott, DO   warfarin (COUMADIN) 5 MG tablet Take 1 tablet by mouth daily Yes Niki Ott, DO   celecoxib (CELEBREX) 200 MG capsule Take 1 capsule by mouth daily Yes Niki Ott, DO   ciclopirox (LOPROX) 0.77 % cream APPLY CREAM TOPICALLY TWICE DAILY TO BOTH FEET/LEGS Yes Historical Provider, MD   Omega-3 Fatty Acids (FISH OIL PO) Take by mouth Yes Historical Provider, MD   Multiple Vitamin (MULTIVITAMIN ADULT PO) Take by mouth Yes Historical Provider, MD   omeprazole (PRILOSEC) 20 MG delayed release capsule Take 20 mg by mouth daily Yes Historical Provider, MD   famotidine (PEPCID) 20 MG tablet Take 20 mg by mouth 2 times daily Yes Historical Provider, MD   zoster recombinant adjuvanted vaccine [facility]) 50 MCG/0.5ML SUSR injection Inject 0.5 mLs into the muscle See Admin Instructions 1 dose now and repeat in 2-6 months  Patient not taking: Reported on 12/6/2022  Niki Ott DO       CareTeam (Including outside providers/suppliers regularly involved in providing care):   Patient Care Team:  Niki Ott DO as PCP - General (Family Medicine)  Niki Ott DO as PCP - [facility]  [facility])  Dr. Nolan Goff (cardiology)  Dr. Hanane Guzman (released, 10 year cancer free)  [facility])  Roby Aceves (Dr. Kenneth Burrows)        Reviewed and updated this visit:  Tobacco  Allergies  Meds  Problems  Med Hx  Surg Hx  Soc Hx  Fam Hx                 Advance Care Planning   Advanced Care Planning: Discussed the patients choices for care and treatment in case of a health event that adversely affects decision-making abilities. Also discussed the patients long-term treatment options. Reviewed with the patient the appropriate state-specific advance directive documents.  Reviewed the process of designating a competent adult as an Agent (or -in-fact) that could take make health care decisions for the patient if incompetent. Patient was asked to complete the declaration forms, either acknowledge the forms by a public notary or an eligible witness and provide a signed copy to the practice office. Will put on file    Time spent (minutes): 5         Cardiovascular Disease Risk Counseling: Assessed the patient's risk to develop cardiovascular disease and reviewed main risk factors. Reviewed steps to reduce disease risk including:   Quitting tobacco use, reducing amount smoked, or not starting the habit  Making healthy food choices  Being physically active and gradualy increasing activity levels   Reduce weight and determine a healthy BMI goal  Monitor blood pressure and treat if higher than 140/90 mmHg  Maintain blood total cholesterol levels under 5 mmol/l or 190 mg/dl  Maintain LDL cholesterol levels under 3.0 mmol/l or 115 mg/dl   Control blood glucose levels  Consider taking aspirin (75 mg daily), once blood pressure is controlled   Provided a follow up plan.     Optimal    Will need recalculation    Time spent (minutes): 5

## 2023-01-03 ENCOUNTER — NURSE ONLY (OUTPATIENT)
Dept: FAMILY MEDICINE CLINIC | Age: 88
End: 2023-01-03
Payer: MEDICARE

## 2023-01-03 DIAGNOSIS — I48.91 ATRIAL FIBRILLATION, UNSPECIFIED TYPE (HCC): Primary | ICD-10-CM

## 2023-01-03 LAB
INTERNATIONAL NORMALIZATION RATIO, POC: 1.7
INTERNATIONAL NORMALIZATION RATIO, POC: 1.7
PROTHROMBIN TIME, POC: 20.3

## 2023-01-03 PROCEDURE — 93793 ANTICOAG MGMT PT WARFARIN: CPT | Performed by: SURGERY

## 2023-01-03 PROCEDURE — 85610 PROTHROMBIN TIME: CPT | Performed by: SURGERY

## 2023-01-10 ENCOUNTER — NURSE ONLY (OUTPATIENT)
Dept: FAMILY MEDICINE CLINIC | Age: 88
End: 2023-01-10

## 2023-01-10 DIAGNOSIS — I48.91 ATRIAL FIBRILLATION, UNSPECIFIED TYPE (HCC): Primary | ICD-10-CM

## 2023-01-10 LAB
INTERNATIONAL NORMALIZATION RATIO, POC: 1.9
PROTHROMBIN TIME, POC: 22.4

## 2023-01-17 ENCOUNTER — NURSE ONLY (OUTPATIENT)
Dept: FAMILY MEDICINE CLINIC | Age: 88
End: 2023-01-17

## 2023-01-17 DIAGNOSIS — I48.91 ATRIAL FIBRILLATION, UNSPECIFIED TYPE (HCC): Primary | ICD-10-CM

## 2023-01-17 LAB
INTERNATIONAL NORMALIZATION RATIO, POC: 2
INTERNATIONAL NORMALIZATION RATIO, POC: 2
PROTHROMBIN TIME, POC: 24.2

## 2023-01-26 ENCOUNTER — NURSE ONLY (OUTPATIENT)
Dept: FAMILY MEDICINE CLINIC | Age: 88
End: 2023-01-26

## 2023-01-26 DIAGNOSIS — I48.91 ATRIAL FIBRILLATION, UNSPECIFIED TYPE (HCC): Primary | ICD-10-CM

## 2023-01-26 LAB
INTERNATIONAL NORMALIZATION RATIO, POC: 2.3
PROTHROMBIN TIME, POC: 27.1

## 2023-03-02 ENCOUNTER — OFFICE VISIT (OUTPATIENT)
Dept: FAMILY MEDICINE CLINIC | Age: 88
End: 2023-03-02

## 2023-03-02 VITALS
HEIGHT: 62 IN | OXYGEN SATURATION: 98 % | HEART RATE: 74 BPM | WEIGHT: 152.7 LBS | TEMPERATURE: 98.1 F | SYSTOLIC BLOOD PRESSURE: 158 MMHG | DIASTOLIC BLOOD PRESSURE: 64 MMHG | BODY MASS INDEX: 28.1 KG/M2 | RESPIRATION RATE: 18 BRPM

## 2023-03-02 DIAGNOSIS — I10 HYPERTENSION, UNSPECIFIED TYPE: ICD-10-CM

## 2023-03-02 DIAGNOSIS — I48.91 ATRIAL FIBRILLATION, UNSPECIFIED TYPE (HCC): Primary | ICD-10-CM

## 2023-03-02 DIAGNOSIS — H40.9 GLAUCOMA, UNSPECIFIED GLAUCOMA TYPE, UNSPECIFIED LATERALITY: ICD-10-CM

## 2023-03-02 DIAGNOSIS — M19.91 PRIMARY OSTEOARTHRITIS, UNSPECIFIED SITE: ICD-10-CM

## 2023-03-02 DIAGNOSIS — K21.9 GASTROESOPHAGEAL REFLUX DISEASE WITHOUT ESOPHAGITIS: ICD-10-CM

## 2023-03-02 DIAGNOSIS — M17.12 OSTEOARTHRITIS OF LEFT KNEE, UNSPECIFIED OSTEOARTHRITIS TYPE: ICD-10-CM

## 2023-03-02 LAB
INTERNATIONAL NORMALIZATION RATIO, POC: 2.4
PROTHROMBIN TIME, POC: 28.6

## 2023-03-02 RX ORDER — TRAMADOL HYDROCHLORIDE 50 MG/1
25 TABLET ORAL 2 TIMES DAILY PRN
Qty: 30 TABLET | Refills: 0 | Status: SHIPPED | OUTPATIENT
Start: 2023-03-02 | End: 2023-04-01

## 2023-03-02 RX ORDER — TRAMADOL HYDROCHLORIDE 50 MG/1
25 TABLET ORAL 2 TIMES DAILY PRN
Qty: 30 TABLET | Refills: 0 | Status: SHIPPED
Start: 2023-03-02 | End: 2023-03-02

## 2023-03-02 SDOH — ECONOMIC STABILITY: FOOD INSECURITY: WITHIN THE PAST 12 MONTHS, YOU WORRIED THAT YOUR FOOD WOULD RUN OUT BEFORE YOU GOT MONEY TO BUY MORE.: NEVER TRUE

## 2023-03-02 SDOH — ECONOMIC STABILITY: INCOME INSECURITY: HOW HARD IS IT FOR YOU TO PAY FOR THE VERY BASICS LIKE FOOD, HOUSING, MEDICAL CARE, AND HEATING?: NOT HARD AT ALL

## 2023-03-02 SDOH — ECONOMIC STABILITY: FOOD INSECURITY: WITHIN THE PAST 12 MONTHS, THE FOOD YOU BOUGHT JUST DIDN'T LAST AND YOU DIDN'T HAVE MONEY TO GET MORE.: NEVER TRUE

## 2023-03-02 SDOH — ECONOMIC STABILITY: HOUSING INSECURITY
IN THE LAST 12 MONTHS, WAS THERE A TIME WHEN YOU DID NOT HAVE A STEADY PLACE TO SLEEP OR SLEPT IN A SHELTER (INCLUDING NOW)?: NO

## 2023-03-02 ASSESSMENT — PATIENT HEALTH QUESTIONNAIRE - PHQ9
SUM OF ALL RESPONSES TO PHQ QUESTIONS 1-9: 0
2. FEELING DOWN, DEPRESSED OR HOPELESS: 0
1. LITTLE INTEREST OR PLEASURE IN DOING THINGS: 0
SUM OF ALL RESPONSES TO PHQ QUESTIONS 1-9: 0
SUM OF ALL RESPONSES TO PHQ QUESTIONS 1-9: 0
SUM OF ALL RESPONSES TO PHQ9 QUESTIONS 1 & 2: 0
SUM OF ALL RESPONSES TO PHQ QUESTIONS 1-9: 0

## 2023-03-02 NOTE — PATIENT INSTRUCTIONS
Reach out to Dr. Svitlana Kelley office. Let him know about increasing fatigue / tiredness and that I can hear the heart murmur more prominently now.

## 2023-03-02 NOTE — PROGRESS NOTES
Edwina Cantu (:  1921) is a 80 y.o. female,Established patient, here for evaluation of the following chief complaint(s): Anticoagulation (INR Check) and Ear Fullness         ASSESSMENT/PLAN:  1. Atrial fibrillation, unspecified type ([address]  -     POCT INR  INR in therapeutic range no need to adjust Coumadin dose  Rate control with metoprolol 25 mg daily    She has a murmur of aortic stenosis right second ICS 3 out of 6 there is no thrill it does radiate to the carotids and it does radiate through to her back though her habitus would allow this to happen easily. She has been asymptomatic in the past regarding this however since her last visit in December she now complains of being tired more frequently throughout the day and having easier fatigability though she denies any breanne shortness of breath or difficulty catching her breath with prolonged exertion such as walking through grocery store walking from the parking lot into the office today. She did see Dr. Ani Kirkland the other month. She is unsure when her last echocardiogram was and we do not have one on file. I recommend that she and her daughter reach out to Dr. Zaheer Grant but and let him know that her murmur of aortic stenosis sounds louder to me today and that she is having some symptoms. I am unsure what they would want to do with this given her age but if there is worsening/now severe aortic stenosis that is symptomatic we could optimize her further. 2. Osteoarthritis of left knee, unspecified osteoarthritis type  -     traMADol (ULTRAM) 50 MG tablet; Take 0.5 tablets by mouth 2 times daily as needed for Pain for up to 30 days. Intended supply: 30 days. Take lowest dose possible to manage pain. Max Daily Amount: 50 mg, Disp-30 tablet, R-0Normal  3.  Hypertension, unspecified type  Forgot to take her medication today this is most likely why her blood pressure is not well controlled  Normotensive at home when she checks  She is going to keep logs at home daily, daughter is going to try and find another device such as a jittery bug cell phone to have for the patient and set reminders for her to take her medication at the same time every day  Back in 2 weeks for blood pressure check bring home cuff  4. Gastroesophageal reflux disease without esophagitis  GERD  -No food within 3 hours of laying to bed (same goes for medications with the exception of sleep aids that are 1 hour prior to bed)  -No drink within 1 hour of laying to bed  -Elevate the head of the bed by 30 degrees (1/2 heigh cinder blocks work well)   -Keep food diary and know/avoid triggers  Hiatal hernia  5. Glaucoma, unspecified glaucoma type, unspecified laterality  Updated medication she does not on 4 eyedrops daily as noted in HPI. She feels that the low vision center in [place] has helped her significantly. Reviewed ophthalmology's notes. Stable, no changes. 6. Primary osteoarthritis, unspecified site  Question benefit of Celebrex  Given cardiac history, history of hypertension, use of anticoagulation and some trace edema on examination today we are going to discontinue the Celebrex  Start trial of tramadol 25 mg twice daily as needed for pain  She will try and make a better effort to schedule her Tylenol daily  7. 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 Manual debridement was done. Tabetic membrane with normal light reflex bilaterally after debridement. Manual debridement of cerumen (procedure):  -Verbal informed consent provided, risks and benefits discussed, including: Bleeding, infection, damage to surrounding tissues or structures.   Patient provided verbal consent to continue with procedure.  -Needed to perform this procedure in order to properly/thoroughly determine the integrity of the tympanic membrane secondary to complaint of hearing loss.  -Under direct visualization using loupe/scoop style ear curette, cerumen and skin detritus was removed from the left and right EAC. -Directly visualization of tympanic membranes after manual debridement reveals normal osseous landmarks, good light reflex bilaterally no perforation.  -Patient tolerated procedure well. No follow-ups on file. Subjective   SUBJECTIVE/OBJECTIVE:  HPI:    HTN: [Taking medication as prescribed without untoward effect. Denies any headaches, vision changes, tinnitus, epistaxis, chest discomfort, shortness of breath, dyspnea.]  -missed bp medication today  -no headache, visual disturbance, epistaxis, dizziness, cp palp sob simpson  -daughter going to get more advanced phone or something similar to set reminders/alarms to take medication on time   -ordered labs but they did not get them done yet    Last we check CBC within normal limits, no evidence of anemia; comprehensive metabolic panel from the same time, within normal limits. Kidney function demonstrates chronic kidney disease stage IIIa, this is stable from prior results. Liver function normal. Lites within normal limits. HLD: [Lipid panel in October 2021 demonstrated a near optimal lipid profile, her LDL was elevated at 110. Patient was encouraged  to watch her diet and provided counseling on heart healthy diet. We will recheck again in 6 months and at that point in time recalculate her cardiovascular risk and make any adjustments necessary. ]  -no untoward effect          Afib:  - HASBLED: 4 points. Risk was 8.9% in one validation study (Lip 2011) and 8.70 bleeds per 100 patient-years in another validation study (Pisters 2010). Alternatives to anticoagulation should be considered: Patient is at high risk for major bleeding. F/u with cardiology in 11/2022.      Stable INR     Dr. Hood Del Angel is following Glaucoma/macular degen:  [Follows with new ophthalmologist.  Last visit one month prior, normal pressures. Next appointment 4/11/2022.]  -no new changes to her eye drop regimen   -timolol  -xalatan  -alphagan  -dorzolamide  -ocular tonometry was from 40s into the 20s and now they are pushing towards the teens    -recommended follow up with [facility] (DME, they provide visual assistive devices) [facility]  Dr. Brennan Bustillos    GERD: [long standing, has hiatal hernia. Does well with her medication. No breakthrough. Lifestyle modifications.]  -does not lay flat for about 4 hours        Arthritis: does well with Tylenol ( 1g in the morning, 1g in evening). -Celebrex  -understands risks and benefits  -she is unsure of the benefit  -we will stop temporarily, if pain returns we can consider resumption or we can consider some alternative like tramadol       Preventative:  Health Maintenance   Topic Date Due    Lipids  10/27/2022    DTaP/Tdap/Td vaccine (1 - Tdap) 07/27/2023 (Originally 9/14/1940)    Shingles vaccine (3 of 3) 03/14/2023    Depression Screen  12/06/2023    Annual Wellness Visit (AWV)  12/07/2023    Flu vaccine  Completed    Pneumococcal 65+ years Vaccine  Completed    COVID-19 Vaccine  Completed    Hepatitis A vaccine  Aged Out    Hib vaccine  Aged Out    Meningococcal (ACWY) vaccine  Aged Out           ROS:    Denies 10pt ROS other than noted in HPI. Objective       PHYSICAL EXAM:    BP (!) 158/64   Pulse 74   Temp 98.1 °F (36.7 °C) (Temporal)   Resp 18   Ht 5' 2\" (1.575 m)   Wt 152 lb 11.2 oz (69.3 kg)   SpO2 98%   BMI 27.93 kg/m²     AVSS    GA: Well-groomed, appears well, no acute distress. HEENT: Atraumatic normocephalic. Extraocular muscles are grossly intact. Pupils are equal round reactive to light. Conjunctiva pink and moist.  Hearing is grossly intact. Nares patent without external drainage.   Buccal mucosa pink and moist.  Posterior oropharynx clear without lesion or exudate. NECK: Trachea is midline, supple, nontender, no lymphadenopathy. CARDIO: Regular rate and rhythm there is a 3 out of 6 systolic ejection murmur appreciated at the right second ICS with radiation to the carotids and to the back. Cap refill 2+. Radial pulses 2+ bilaterally. RESPIRATORY: Clear to auscultation bilaterally without wheezes rales or rhonchi. Normal inspiratory and expiratory effort. Normoxic on room air    ABD: Mildly rounded, normoactive bowel sounds. Soft, nontender, no organomegaly. MSK: Structurally appropriate for age. No gross deficit. Get up and go less than 20 seconds. Gait is antalgic. NEURO: Alert, no gross deficit. PSYCH:  Mood is normal and congruent with affect. No signs of psychomotor retardation or agitation. Thought content seems normal, speech is fluent and non-pressured. SKIN: Generally warm pink and dry. An electronic signature was used to authenticate this note.     --Jone Vaughan, DO

## 2023-03-02 NOTE — LETTER
CONTROLLED SUBSTANCE MEDICATION AGREEMENT     Patient Name: Jumana De La Rosa  Patient YOB: 1921   I understand, that controlled substance medications may be used to help better manage my symptoms and to improve my ability to function at home, work and in social settings. However, I also understand that these medications do have risks, which have been discussed with me, including possible development of physical or psychological dependence. I understand that successful treatment requires mutual trust and honesty between me and my provider. I understand and agree that following this Medication Agreement is necessary in continuing my provider-patient relationship and the success of my treatment plan. Explanation from my Provider: Benefits and Goals of Controlled Substance Medications: There are two potential goals for your treatment: (1) decreased pain and suffering (2) improved daily life functions. There are many possible treatments for your chronic condition(s). Alternatives such as physical therapy, yoga, massage, home daily exercise, meditation, relaxation techniques, injections, chiropractic manipulations, surgery, cognitive therapy, hypnosis and many medications that are not habit-forming may be used. Use of controlled substance medications may be helpful, but they are unlikely to resolve all symptoms or restore all function. Explanation from my Provider: Risks of Controlled Substance Medications:  Opioid pain medications: These medications can lead to problems such as addiction/dependence, sedation, lightheadedness/dizziness, memory issues, falls, constipation, nausea, or vomiting. They may also impair the ability to drive or operate machinery. Additionally, these medications may lower testosterone levels, leading to loss of bone strength, stamina and sex drive.   They may cause problems with breathing, sleep apnea and reduced coughing, which is especially dangerous for patients with lung disease. Overdose or dangerous interactions with alcohol and other medications may occur, leading to death. Hyperalgesia may develop, which means patients receiving opioids for the treatment of pain may become more sensitive to certain painful stimuli, and in some cases, experience pain from ordinarily non-painful stimuli. Women between the ages of 14-53 who could become pregnant should carefully weigh the risks and benefits of opioids with their physicians, as these medications increase the risk of pregnancy complications, including miscarriage,  delivery and stillbirth. It is also possible for babies to be born addicted to opioids. Opioid dependence withdrawal symptoms may include; feelings of uneasiness, increased pain, irritability, belly pain, diarrhea, sweats and goose-flesh. Benzodiazepines and non-benzodiazepine sleep medications: These medications can lead to problems such as addiction/dependence, sedation, fatigue, lightheadedness, dizziness, incoordination, falls, depression, hallucinations, and impaired judgment, memory and concentration. The ability to drive and operate machinery may also be affected. Abnormal sleep-related behaviors have been reported, including sleepwalking, driving, making telephone calls, eating, or having sex while not fully awake. These medications can suppress breathing and worsen sleep apnea, particularly when combined with alcohol or other sedating medications, potentially leading to death. Dependence withdrawal symptoms may include tremors, anxiety, hallucinations and seizures. Stimulants:  Common adverse effects include addiction/dependence, increased blood  pressure and heart rate, decreased appetite, nausea, involuntary weight loss, insomnia,                                                                                                                     Initials:_______   irritability, and headaches.   These risks may increase when these medications are combined with other stimulants, such as caffeine pills or energy drinks, certain weight loss supplements and oral decongestants. Dependence withdrawal symptoms may include depressed mood, loss of interest, suicidal thoughts, anxiety, fatigue, appetite changes and agitation. Testosterone replacement therapy:  Potential side effects include increased risk of stroke and heart attack, blood clots, increased blood pressure, increased cholesterol, enlarged prostate, sleep apnea, irritability/aggression and other mood disorders, and decreased fertility. I agree and understand that I and my prescriber have the following rights and responsibilities regarding my treatment plan:     1. MY RIGHTS:  To be informed of my treatment and medication plan. To be an active participant in my health and wellbeing. 2. MY RESPONSIBILITY AND UNDERSTANDING FOR USE OF MEDICATIONS   I will take medications at the dose and frequency as directed. For my safety, I will not increase or change how I take my medications without the recommendation of my healthcare provider.  I will actively participate in any program recommended by my provider which may improve function, including social, physical, psychological programs.  I will not take my medications with alcohol or other drugs not prescribed to me. I understand that drinking alcohol with my medications increases the chances of side effects, including reduced breathing rate and could lead to personal injury when operating machinery.  I understand that if I have a history of substance use disorders, including alcohol or other illicit drugs, that I may be at increased risk of addiction to my medications.  I agree to notify my provider immediately if I should become pregnant so that my treatment plan can be adjusted.    I agree and understand that I shall only receive controlled substance medications from the prescriber that signed this agreement unless there is written agreement among other prescribers of controlled substances outlining the responsibility of the medications being prescribed.  I understand that the if the controlled medication is not helping to achieve goals, the dosage may be tapered and no longer prescribed. 3. MY RESPONSIBILITY FOR COMMUNICATION / PRESCRIPTION RENEWALS   I agree that all controlled substance medications that I take will be prescribed only by my provider. If another healthcare provider prescribes me medication in an emergency, I will notify my provider within seventy-two (72) hours.  I will arrange for refills at the prescribed interval ONLY during regular office hours. I will not ask for refills earlier than agreed, after-hours, on holidays or weekends. Refills may take up to 72 hours for processing and prescriptions to reach the pharmacy.  I will inform my other health care providers that I am taking these medications and of the existence of this [id number]. In the event of an emergency, I will provide the same information to the emergency department prescribers.  I will keep my provider updated on the pharmacy I am using for controlled medication prescription filling. Initials:_______  4. MY RESPONSIBILITY FOR PROTECTING MEDICATIONS   I will protect my prescriptions and medications. I understand that lost or misplaced prescriptions will not be replaced.  I will keep medications only for my own use and will not share them with others. I will keep all medications away from children.  I agree that if my medications are adjusted or discontinued, I will properly dispose of any remaining medications. I understand that I will be required to dispose of any remaining controlled medications as, directed by my prescriber, prior to being provided with any prescriptions for other controlled medications.   Medication drop box locations can be found at: [employer]dk    5. MY RESPONSIBILITY WITH ILLEGAL DRUGS    I will not use illegal or street drugs or another person's prescription medications not prescribed to me.  If there are identified addiction type symptoms, then referral to a program may be provided by my provider and I agree to follow through with this recommendation. 6. MY RESPONSIBILITY FOR COOPERATION WITH INVESTIGATIONS   I understand that my provider will comply with any applicable law and may discuss my use and/or possible misuse/abuse of controlled substances and alcohol, as appropriate, with any health care provider involved in my care, pharmacist, or legal authority.  I authorize my provider and pharmacy to cooperate fully with law enforcement agencies (as permitted by law) in the investigation of any possible misuse, sale, or other diversion of my controlled substances.  I agree to waive any applicable privilege or right of privacy or confidentiality with respect to these authorizations. 7. PROVIDERS RIGHT TO MONITOR FOR SAFETY: PRESCRIPTION MONITORING / DRUG TESTING   I consent to drug/toxicology screening and will submit to a drug screen upon my providers request to assure I am only taking the prescribed drugs for my safety monitoring. I understand that a drug screen is a laboratory test in which a sample of my urine, blood or saliva is checked to see what drugs I have been taking. This may entail an observed urine specimen, which means that a nurse or other health care provider may watch me provide urine, and I will cooperate if I am asked to provide an observed specimen.  I understand that my provider will check a copy of my State Prescription Monitoring Program () Report in order to safely prescribe medications.  Pill Counts: I consent to pill counts when requested.   I may be asked to bring all my prescribed controlled substance medications, in their original bottles, to all of my scheduled appointments. In addition, my provider may ask me to come to the practice at any time for a random pill count. 8. TERMINATION OF THIS AGREEMENT  For my safety, my prescriber has the right to stop prescribing controlled substance medications and may end this agreement. Initials:_______   Conditions that may result in termination of this agreement:  a. I do not show any improvement in pain, or my activity has not improved. b. I develop rapid tolerance or loss of improvement, as described in my treatment plan.  c. I develop significant side effects from the medication. d. My behavior is not consistent with the responsibilities outlined above, thereby causing safety concerns to continue prescribing controlled substance medications. e. I fail to follow the terms of this agreement. f. Other:____________________________       UNDERSTANDING THIS MEDICATION AGREEMENT:    I have read the above and have had all my questions answered. For chronic disease management, I know that my symptoms can be managed with many types of treatments. A chronic medication trial may be part of my treatment, but I must be an active participant in my care. Medication therapy is only one part of my symptom management plan. In some cases, there may be limited scientific evidence to support the chronic use of certain medications to improve symptoms and daily function. Furthermore, in certain circumstances, there may be scientific information that suggests that the use of chronic controlled substances may worsen my symptoms and increase my risk of unintentional death directly related to this medication therapy. I know that if my provider feels my risk from controlled medications is greater than my benefit, I will have my controlled substance medication(s) compassionately lowered or removed altogether.      I further agree to allow this office to contact my HIPAA contact if there are concerns about my safety and use of the controlled medications. I have agreed to use the prescribed controlled substance medications to me as instructed by my provider and as stated in this Medication Agreement. My initial on each page and my signature below shows that I have read each page and I have had the opportunity to ask questions with answers provided by my provider.     Patient Name (Printed): _____________________________________  Patient Signature:  ______________________   Date: _____________    Prescriber Name (Printed): ___________________________________  Prescriber Signature: _____________________  Date: _____________

## 2023-03-16 ENCOUNTER — NURSE ONLY (OUTPATIENT)
Dept: FAMILY MEDICINE CLINIC | Age: 88
End: 2023-03-16

## 2023-03-16 VITALS — DIASTOLIC BLOOD PRESSURE: 78 MMHG | SYSTOLIC BLOOD PRESSURE: 160 MMHG

## 2023-03-16 DIAGNOSIS — I10 HYPERTENSION, UNSPECIFIED TYPE: Primary | ICD-10-CM

## 2023-03-16 PROCEDURE — 99999 PR OFFICE/OUTPT VISIT,PROCEDURE ONLY: CPT | Performed by: SURGERY

## 2023-03-17 DIAGNOSIS — I10 PRIMARY HYPERTENSION: Primary | ICD-10-CM

## 2023-03-17 RX ORDER — LISINOPRIL 20 MG/1
20 TABLET ORAL DAILY
Qty: 90 TABLET | Refills: 1 | Status: SHIPPED | OUTPATIENT
Start: 2023-03-17

## 2023-04-04 ENCOUNTER — NURSE ONLY (OUTPATIENT)
Dept: FAMILY MEDICINE CLINIC | Age: 88
End: 2023-04-04

## 2023-04-04 VITALS — SYSTOLIC BLOOD PRESSURE: 136 MMHG | DIASTOLIC BLOOD PRESSURE: 62 MMHG

## 2023-04-04 DIAGNOSIS — I10 HYPERTENSION, UNSPECIFIED TYPE: ICD-10-CM

## 2023-04-04 DIAGNOSIS — I48.91 ATRIAL FIBRILLATION, UNSPECIFIED TYPE (HCC): Primary | ICD-10-CM

## 2023-04-04 LAB
INTERNATIONAL NORMALIZATION RATIO, POC: 2.2
PROTHROMBIN TIME, POC: 26.7

## 2023-05-02 ENCOUNTER — NURSE ONLY (OUTPATIENT)
Dept: FAMILY MEDICINE CLINIC | Age: 88
End: 2023-05-02
Payer: MEDICARE

## 2023-05-02 DIAGNOSIS — I48.91 ATRIAL FIBRILLATION, UNSPECIFIED TYPE (HCC): Primary | ICD-10-CM

## 2023-05-02 LAB
INTERNATIONAL NORMALIZATION RATIO, POC: 2.5
PROTHROMBIN TIME, POC: 29.7

## 2023-05-02 PROCEDURE — 85610 PROTHROMBIN TIME: CPT | Performed by: SURGERY

## 2023-05-02 PROCEDURE — 93793 ANTICOAG MGMT PT WARFARIN: CPT | Performed by: SURGERY

## 2023-05-31 DIAGNOSIS — I10 HYPERTENSION, UNSPECIFIED TYPE: ICD-10-CM

## 2023-05-31 DIAGNOSIS — E78.5 HYPERLIPIDEMIA, UNSPECIFIED HYPERLIPIDEMIA TYPE: ICD-10-CM

## 2023-05-31 LAB
ALBUMIN SERPL-MCNC: 3.9 G/DL (ref 3.5–5.2)
ALP SERPL-CCNC: 57 U/L (ref 35–104)
ALT SERPL-CCNC: 12 U/L (ref 0–32)
ANION GAP SERPL CALCULATED.3IONS-SCNC: 13 MMOL/L (ref 7–16)
AST SERPL-CCNC: 26 U/L (ref 0–31)
BASOPHILS # BLD: 0.05 E9/L (ref 0–0.2)
BASOPHILS NFR BLD: 0.8 % (ref 0–2)
BILIRUB SERPL-MCNC: 0.3 MG/DL (ref 0–1.2)
BUN SERPL-MCNC: 17 MG/DL (ref 6–23)
CALCIUM SERPL-MCNC: 9.5 MG/DL (ref 8.6–10.2)
CHLORIDE SERPL-SCNC: 105 MMOL/L (ref 98–107)
CHOLESTEROL, FASTING: 162 MG/DL (ref 0–199)
CO2 SERPL-SCNC: 21 MMOL/L (ref 22–29)
CREAT SERPL-MCNC: 0.9 MG/DL (ref 0.5–1)
EOSINOPHIL # BLD: 0.22 E9/L (ref 0.05–0.5)
EOSINOPHIL NFR BLD: 3.6 % (ref 0–6)
ERYTHROCYTE [DISTWIDTH] IN BLOOD BY AUTOMATED COUNT: 13.6 FL (ref 11.5–15)
GLUCOSE SERPL-MCNC: 102 MG/DL (ref 74–99)
HCT VFR BLD AUTO: 41.2 % (ref 34–48)
HDLC SERPL-MCNC: 59 MG/DL
HGB BLD-MCNC: 12.8 G/DL (ref 11.5–15.5)
IMM GRANULOCYTES # BLD: 0.02 E9/L
IMM GRANULOCYTES NFR BLD: 0.3 % (ref 0–5)
LDL CHOLESTEROL CALCULATED: 75 MG/DL (ref 0–99)
LYMPHOCYTES # BLD: 1.82 E9/L (ref 1.5–4)
LYMPHOCYTES NFR BLD: 29.8 % (ref 20–42)
MCH RBC QN AUTO: 30.4 PG (ref 26–35)
MCHC RBC AUTO-ENTMCNC: 31.1 % (ref 32–34.5)
MCV RBC AUTO: 97.9 FL (ref 80–99.9)
MONOCYTES # BLD: 0.56 E9/L (ref 0.1–0.95)
MONOCYTES NFR BLD: 9.2 % (ref 2–12)
NEUTROPHILS # BLD: 3.44 E9/L (ref 1.8–7.3)
NEUTS SEG NFR BLD: 56.3 % (ref 43–80)
PLATELET # BLD AUTO: 240 E9/L (ref 130–450)
PMV BLD AUTO: 10.8 FL (ref 7–12)
POTASSIUM SERPL-SCNC: 4.6 MMOL/L (ref 3.5–5)
PROT SERPL-MCNC: 7.2 G/DL (ref 6.4–8.3)
RBC # BLD AUTO: 4.21 E12/L (ref 3.5–5.5)
SODIUM SERPL-SCNC: 139 MMOL/L (ref 132–146)
TRIGLYCERIDE, FASTING: 142 MG/DL (ref 0–149)
VLDLC SERPL CALC-MCNC: 28 MG/DL
WBC # BLD: 6.1 E9/L (ref 4.5–11.5)

## 2023-06-06 ENCOUNTER — OFFICE VISIT (OUTPATIENT)
Dept: FAMILY MEDICINE CLINIC | Age: 88
End: 2023-06-06
Payer: MEDICARE

## 2023-06-06 VITALS
SYSTOLIC BLOOD PRESSURE: 124 MMHG | TEMPERATURE: 97 F | HEART RATE: 61 BPM | DIASTOLIC BLOOD PRESSURE: 66 MMHG | OXYGEN SATURATION: 98 %

## 2023-06-06 DIAGNOSIS — N18.31 ACUTE RENAL FAILURE SUPERIMPOSED ON STAGE 3A CHRONIC KIDNEY DISEASE, UNSPECIFIED ACUTE RENAL FAILURE TYPE (HCC): ICD-10-CM

## 2023-06-06 DIAGNOSIS — N17.9 ACUTE RENAL FAILURE SUPERIMPOSED ON STAGE 3A CHRONIC KIDNEY DISEASE, UNSPECIFIED ACUTE RENAL FAILURE TYPE (HCC): ICD-10-CM

## 2023-06-06 DIAGNOSIS — K21.9 GASTROESOPHAGEAL REFLUX DISEASE WITHOUT ESOPHAGITIS: ICD-10-CM

## 2023-06-06 DIAGNOSIS — Z79.01 CURRENT USE OF LONG TERM ANTICOAGULATION: ICD-10-CM

## 2023-06-06 DIAGNOSIS — H40.9 GLAUCOMA, UNSPECIFIED GLAUCOMA TYPE, UNSPECIFIED LATERALITY: ICD-10-CM

## 2023-06-06 DIAGNOSIS — I48.91 ATRIAL FIBRILLATION, UNSPECIFIED TYPE (HCC): Primary | ICD-10-CM

## 2023-06-06 DIAGNOSIS — I10 PRIMARY HYPERTENSION: ICD-10-CM

## 2023-06-06 DIAGNOSIS — M19.91 PRIMARY OSTEOARTHRITIS, UNSPECIFIED SITE: ICD-10-CM

## 2023-06-06 DIAGNOSIS — M17.12 OSTEOARTHRITIS OF LEFT KNEE, UNSPECIFIED OSTEOARTHRITIS TYPE: ICD-10-CM

## 2023-06-06 LAB
INTERNATIONAL NORMALIZATION RATIO, POC: 2.4
PROTHROMBIN TIME, POC: 28.7

## 2023-06-06 PROCEDURE — 85610 PROTHROMBIN TIME: CPT | Performed by: SURGERY

## 2023-06-06 PROCEDURE — 1123F ACP DISCUSS/DSCN MKR DOCD: CPT | Performed by: SURGERY

## 2023-06-06 PROCEDURE — 99215 OFFICE O/P EST HI 40 MIN: CPT | Performed by: SURGERY

## 2023-06-06 NOTE — PROGRESS NOTES
Lisinopril 20mg  Toprolol XL 25mg      4. Gastroesophageal reflux disease without esophagitis  GERD  -No food within 3 hours of laying to bed (same goes for medications with the exception of sleep aids that are 1 hour prior to bed)  -No drink within 1 hour of laying to bed  -Elevate the head of the bed by 30 degrees (1/2 hebetsy cinder blocks work well)   -Keep food diary and know/avoid triggers  Hiatal hernia  Omeprazole 20mg daily   Stable  No change     5. Glaucoma, unspecified glaucoma type, unspecified laterality  [Updated medication she does not on 4 eyedrops daily as noted in HPI. She feels that the low vision center in [place] has helped her significantly. Reviewed ophthalmology's notes. Stable, no changes.]  [address] in [facility] improved / stable for her  Stable  No changes     6. Primary osteoarthritis, unspecified site  [Question benefit of Celebrex  Given cardiac history, history of hypertension, use of anticoagulation and some trace edema on examination today we are going to discontinue the Celebrex  Start trial of tramadol 25 mg twice daily as needed for pain  She will try and make a better effort to schedule her Tylenol daily]    As above      7. CKD3a  Component Ref Range & Units 5/31/23 1006 1/3/22 1005 10/27/21 1017 7/14/20 7/14/20 6/17/19 6/17/19   Sodium 132 - 146 mmol/L 139  143  142        Potassium 3.5 - 5.0 mmol/L 4.6  4.7  4.7  4.5 R   4.2 R     Chloride 98 - 107 mmol/L 105  107  108 High         CO2 22 - 29 mmol/L 21 Low   22  20 Low         Anion Gap 7 - 16 mmol/L 13  14  14        Glucose 74 - 99 mg/dL 102 High   91  89        BUN 6 - 23 mg/dL 17  16  18        Creatinine 0.5 - 1.0 mg/dL 0.9  1.0  0.9   0.9 R   0.9 R    Est, Glom Filt Rate >=60 mL/min/1.73 56          Comment: Pediatric calculator link   Antwan.at. org/professionals/kdoqi/gfr_calculatorped   Effective Oct 3, 2022   These results are not intended for use in patients   <25years of age.   eGFR

## 2023-06-24 DIAGNOSIS — E78.5 HYPERLIPIDEMIA, UNSPECIFIED HYPERLIPIDEMIA TYPE: ICD-10-CM

## 2023-06-26 RX ORDER — SIMVASTATIN 20 MG
20 TABLET ORAL NIGHTLY
Qty: 90 TABLET | Refills: 0 | Status: SHIPPED | OUTPATIENT
Start: 2023-06-26

## 2023-07-06 ENCOUNTER — NURSE ONLY (OUTPATIENT)
Dept: FAMILY MEDICINE CLINIC | Age: 88
End: 2023-07-06
Payer: MEDICARE

## 2023-07-06 DIAGNOSIS — I48.91 ATRIAL FIBRILLATION, UNSPECIFIED TYPE (HCC): Primary | ICD-10-CM

## 2023-07-06 DIAGNOSIS — Z79.01 LONG TERM (CURRENT) USE OF ANTICOAGULANTS: ICD-10-CM

## 2023-07-06 LAB
INTERNATIONAL NORMALIZATION RATIO, POC: 2
PROTHROMBIN TIME, POC: 24.1

## 2023-07-06 PROCEDURE — 93793 ANTICOAG MGMT PT WARFARIN: CPT | Performed by: SURGERY

## 2023-07-06 PROCEDURE — 85610 PROTHROMBIN TIME: CPT | Performed by: SURGERY

## 2023-07-06 NOTE — PROGRESS NOTES
7/6/23 INR 2.0 PT 24.1   Coumadin 5 mg Mon, Wed, Fri, 2.5 mg other days. Patient missed does on Thursday 6/29/23.       Stable no change recheck in month, per Dr Brandon Blackmon notified   Pt/inr scheduled 8/1/23 at [phone]

## 2023-08-01 ENCOUNTER — NURSE ONLY (OUTPATIENT)
Dept: FAMILY MEDICINE CLINIC | Age: 88
End: 2023-08-01
Payer: MEDICARE

## 2023-08-01 DIAGNOSIS — I49.9 CARDIAC ARRHYTHMIA, UNSPECIFIED CARDIAC ARRHYTHMIA TYPE: Primary | ICD-10-CM

## 2023-08-01 LAB
INTERNATIONAL NORMALIZATION RATIO, POC: 3
PROTHROMBIN TIME, POC: 35.6

## 2023-08-01 PROCEDURE — 93793 ANTICOAG MGMT PT WARFARIN: CPT | Performed by: SURGERY

## 2023-08-01 PROCEDURE — 85610 PROTHROMBIN TIME: CPT | Performed by: SURGERY

## 2023-08-29 ENCOUNTER — NURSE ONLY (OUTPATIENT)
Dept: FAMILY MEDICINE CLINIC | Age: 88
End: 2023-08-29
Payer: MEDICARE

## 2023-08-29 DIAGNOSIS — I48.91 ATRIAL FIBRILLATION, UNSPECIFIED TYPE (HCC): Primary | ICD-10-CM

## 2023-08-29 LAB
INTERNATIONAL NORMALIZATION RATIO, POC: 2.3
PROTHROMBIN TIME, POC: 27.2

## 2023-08-29 PROCEDURE — 93793 ANTICOAG MGMT PT WARFARIN: CPT | Performed by: SURGERY

## 2023-08-29 PROCEDURE — 85610 PROTHROMBIN TIME: CPT | Performed by: SURGERY

## 2023-09-01 DIAGNOSIS — I10 PRIMARY HYPERTENSION: ICD-10-CM

## 2023-09-01 RX ORDER — LISINOPRIL 20 MG/1
TABLET ORAL
Qty: 90 TABLET | Refills: 0 | Status: SHIPPED | OUTPATIENT
Start: 2023-09-01

## 2023-09-15 DIAGNOSIS — E78.5 HYPERLIPIDEMIA, UNSPECIFIED HYPERLIPIDEMIA TYPE: ICD-10-CM

## 2023-09-15 RX ORDER — SIMVASTATIN 20 MG
20 TABLET ORAL NIGHTLY
Qty: 90 TABLET | Refills: 0 | Status: SHIPPED | OUTPATIENT
Start: 2023-09-15

## 2023-09-27 ENCOUNTER — NURSE ONLY (OUTPATIENT)
Dept: FAMILY MEDICINE CLINIC | Age: 88
End: 2023-09-27
Payer: MEDICARE

## 2023-09-27 DIAGNOSIS — Z79.01 CURRENT USE OF LONG TERM ANTICOAGULATION: ICD-10-CM

## 2023-09-27 DIAGNOSIS — Z79.01 LONG TERM (CURRENT) USE OF ANTICOAGULANTS: Primary | ICD-10-CM

## 2023-09-27 LAB
INTERNATIONAL NORMALIZATION RATIO, POC: 2.5
PROTHROMBIN TIME, POC: 29.5

## 2023-09-27 PROCEDURE — 93793 ANTICOAG MGMT PT WARFARIN: CPT | Performed by: SURGERY

## 2023-09-27 PROCEDURE — 85610 PROTHROMBIN TIME: CPT | Performed by: SURGERY

## 2023-10-24 ENCOUNTER — NURSE ONLY (OUTPATIENT)
Dept: FAMILY MEDICINE CLINIC | Age: 88
End: 2023-10-24
Payer: MEDICARE

## 2023-10-24 DIAGNOSIS — Z79.01 LONG TERM (CURRENT) USE OF ANTICOAGULANTS: Primary | ICD-10-CM

## 2023-10-24 LAB
INTERNATIONAL NORMALIZATION RATIO, POC: 1.8
PROTHROMBIN TIME, POC: 21.8

## 2023-10-24 PROCEDURE — 93793 ANTICOAG MGMT PT WARFARIN: CPT | Performed by: SURGERY

## 2023-10-24 PROCEDURE — 85610 PROTHROMBIN TIME: CPT | Performed by: SURGERY

## 2023-10-24 NOTE — PROGRESS NOTES
10/24/23 INR 1.8, PT 21.8  Coumadin 5 mg Mon, Wed, and Fri, 2.5 mg all other days.     Recheck Friday per Dr Edmar Ho notified

## 2023-10-27 ENCOUNTER — NURSE ONLY (OUTPATIENT)
Dept: FAMILY MEDICINE CLINIC | Age: 88
End: 2023-10-27
Payer: MEDICARE

## 2023-10-27 DIAGNOSIS — Z79.01 LONG TERM (CURRENT) USE OF ANTICOAGULANTS: Primary | ICD-10-CM

## 2023-10-27 DIAGNOSIS — Z79.01 CURRENT USE OF LONG TERM ANTICOAGULATION: ICD-10-CM

## 2023-10-27 LAB
INTERNATIONAL NORMALIZATION RATIO, POC: 2.4
PROTHROMBIN TIME, POC: 28.9

## 2023-10-27 PROCEDURE — 85610 PROTHROMBIN TIME: CPT | Performed by: SURGERY

## 2023-10-27 NOTE — PROGRESS NOTES
Spoke with Hanh Oropeza and notified. She verbalized understanding. aHnh Oropeza is also asking if patient would be eligible for a home INR machine since she is 8 years old and is practically homebound.

## 2023-11-14 ENCOUNTER — TELEPHONE (OUTPATIENT)
Dept: FAMILY MEDICINE CLINIC | Age: 88
End: 2023-11-14

## 2023-11-14 DIAGNOSIS — I10 PRIMARY HYPERTENSION: Primary | ICD-10-CM

## 2023-11-14 DIAGNOSIS — E78.5 HYPERLIPIDEMIA, UNSPECIFIED HYPERLIPIDEMIA TYPE: ICD-10-CM

## 2023-11-14 DIAGNOSIS — I10 PRIMARY HYPERTENSION: ICD-10-CM

## 2023-11-14 NOTE — TELEPHONE ENCOUNTER
Patient's daughter, Rashida Odom, called to inform that patient just saw Dr. Gurvinder Mauro and he advised for them to call Dr. Julius Paula of[facility] to ask that he increase Lisinopril to 30 mg daily.      Last seen 6/6/2023  Next appt 11/28/2023  [facility]

## 2023-11-15 RX ORDER — LISINOPRIL 30 MG/1
30 TABLET ORAL DAILY
Qty: 90 TABLET | Refills: 1 | Status: SHIPPED | OUTPATIENT
Start: 2023-11-15

## 2023-12-01 ENCOUNTER — TELEPHONE (OUTPATIENT)
Dept: FAMILY MEDICINE CLINIC | Age: 88
End: 2023-12-01

## 2023-12-01 ENCOUNTER — NURSE ONLY (OUTPATIENT)
Dept: FAMILY MEDICINE CLINIC | Age: 88
End: 2023-12-01

## 2023-12-01 DIAGNOSIS — I10 PRIMARY HYPERTENSION: ICD-10-CM

## 2023-12-01 DIAGNOSIS — Z79.01 LONG TERM (CURRENT) USE OF ANTICOAGULANTS: Primary | ICD-10-CM

## 2023-12-01 DIAGNOSIS — Z79.01 CURRENT USE OF LONG TERM ANTICOAGULATION: ICD-10-CM

## 2023-12-01 DIAGNOSIS — I48.91 ATRIAL FIBRILLATION, UNSPECIFIED TYPE (HCC): ICD-10-CM

## 2023-12-01 DIAGNOSIS — I10 PRIMARY HYPERTENSION: Primary | ICD-10-CM

## 2023-12-01 LAB
ABSOLUTE IMMATURE GRANULOCYTE: <0.03 K/UL (ref 0–0.58)
ALBUMIN SERPL-MCNC: 3.8 G/DL (ref 3.5–5.2)
ALP BLD-CCNC: 60 U/L (ref 35–104)
ALT SERPL-CCNC: 9 U/L (ref 0–32)
ANION GAP SERPL CALCULATED.3IONS-SCNC: 18 MMOL/L (ref 7–16)
AST SERPL-CCNC: 27 U/L (ref 0–31)
BASOPHILS ABSOLUTE: 0.05 K/UL (ref 0–0.2)
BASOPHILS RELATIVE PERCENT: 1 % (ref 0–2)
BILIRUB SERPL-MCNC: 0.4 MG/DL (ref 0–1.2)
BUN BLDV-MCNC: 19 MG/DL (ref 6–23)
CALCIUM SERPL-MCNC: 9.2 MG/DL (ref 8.6–10.2)
CHLORIDE BLD-SCNC: 102 MMOL/L (ref 98–107)
CO2: 21 MMOL/L (ref 22–29)
CREAT SERPL-MCNC: 0.9 MG/DL (ref 0.5–1)
EOSINOPHILS ABSOLUTE: 0.18 K/UL (ref 0.05–0.5)
EOSINOPHILS RELATIVE PERCENT: 3 % (ref 0–6)
GFR SERPL CREATININE-BSD FRML MDRD: 56 ML/MIN/1.73M2
GLUCOSE BLD-MCNC: 89 MG/DL (ref 74–99)
HCT VFR BLD CALC: 38 % (ref 34–48)
HEMOGLOBIN: 12.2 G/DL (ref 11.5–15.5)
IMMATURE GRANULOCYTES: 0 % (ref 0–5)
INTERNATIONAL NORMALIZATION RATIO, POC: 2.6
LYMPHOCYTES ABSOLUTE: 1.74 K/UL (ref 1.5–4)
LYMPHOCYTES RELATIVE PERCENT: 30 % (ref 20–42)
MCH RBC QN AUTO: 31.6 PG (ref 26–35)
MCHC RBC AUTO-ENTMCNC: 32.1 G/DL (ref 32–34.5)
MCV RBC AUTO: 98.4 FL (ref 80–99.9)
MONOCYTES ABSOLUTE: 0.64 K/UL (ref 0.1–0.95)
MONOCYTES RELATIVE PERCENT: 11 % (ref 2–12)
NEUTROPHILS ABSOLUTE: 3.19 K/UL (ref 1.8–7.3)
NEUTROPHILS RELATIVE PERCENT: 55 % (ref 43–80)
PDW BLD-RTO: 12.9 % (ref 11.5–15)
PLATELET # BLD: 212 K/UL (ref 130–450)
PMV BLD AUTO: 11.2 FL (ref 7–12)
POTASSIUM SERPL-SCNC: 5 MMOL/L (ref 3.5–5)
PROTHROMBIN TIME, POC: 31.5
RBC # BLD: 3.86 M/UL (ref 3.5–5.5)
SODIUM BLD-SCNC: 141 MMOL/L (ref 132–146)
TOTAL PROTEIN: 7 G/DL (ref 6.4–8.3)
WBC # BLD: 5.8 K/UL (ref 4.5–11.5)

## 2023-12-01 RX ORDER — AMLODIPINE BESYLATE 2.5 MG/1
2.5 TABLET ORAL DAILY
Qty: 30 TABLET | Refills: 11 | Status: SHIPPED | OUTPATIENT
Start: 2023-12-01

## 2023-12-01 NOTE — TELEPHONE ENCOUNTER
Blood pressure check    /68 P 60    Patient denies chest pain, SOB, headaches. Lisinopril increased to 30 mg once daily on 11/15/23.     Last Appointment   6/6/2023  Next Appointment  12/20/2023

## 2023-12-01 NOTE — PROGRESS NOTES
12/1/23 INR 2.6 PT 31.5 Coumadin 5 mg Mon, Wed, and Fri, 2.5 mg all other days    Per Dr Willard Adjutant: Stable no change recheck at next scheduled interval (which may be weekly now if we have the home INR monitor)     [place] notified

## 2023-12-06 ENCOUNTER — TELEPHONE (OUTPATIENT)
Dept: FAMILY MEDICINE CLINIC | Age: 88
End: 2023-12-06

## 2023-12-06 DIAGNOSIS — I10 PRIMARY HYPERTENSION: Primary | ICD-10-CM

## 2023-12-06 DIAGNOSIS — I10 PRIMARY HYPERTENSION: ICD-10-CM

## 2023-12-06 NOTE — TELEPHONE ENCOUNTER
Patients' daughter is concerned about her blood pressure  readings. Her readings today are high. Her readings today were 183/90, 153/80, 165/89. She is taking her medications and feels well.  Please advise

## 2023-12-08 ENCOUNTER — NURSE ONLY (OUTPATIENT)
Dept: FAMILY MEDICINE CLINIC | Age: 88
End: 2023-12-08

## 2023-12-08 ENCOUNTER — TELEPHONE (OUTPATIENT)
Dept: FAMILY MEDICINE CLINIC | Age: 88
End: 2023-12-08

## 2023-12-08 VITALS — DIASTOLIC BLOOD PRESSURE: 64 MMHG | SYSTOLIC BLOOD PRESSURE: 152 MMHG | HEART RATE: 63 BPM

## 2023-12-08 DIAGNOSIS — I10 PRIMARY HYPERTENSION: Primary | ICD-10-CM

## 2023-12-08 DIAGNOSIS — I10 PRIMARY HYPERTENSION: ICD-10-CM

## 2023-12-08 LAB
ALBUMIN SERPL-MCNC: 3.8 G/DL (ref 3.5–5.2)
ANION GAP SERPL CALCULATED.3IONS-SCNC: 11 MMOL/L (ref 7–16)
BUN BLDV-MCNC: 20 MG/DL (ref 6–23)
CALCIUM SERPL-MCNC: 9.3 MG/DL (ref 8.6–10.2)
CHLORIDE BLD-SCNC: 105 MMOL/L (ref 98–107)
CO2: 20 MMOL/L (ref 22–29)
CREAT SERPL-MCNC: 0.8 MG/DL (ref 0.5–1)
CREATININE URINE: 294.2 MG/DL (ref 29–226)
GFR SERPL CREATININE-BSD FRML MDRD: >60 ML/MIN/1.73M2
GLUCOSE BLD-MCNC: 97 MG/DL (ref 74–99)
MICROALBUMIN/CREAT 24H UR: 42 MG/L (ref 0–19)
MICROALBUMIN/CREAT UR-RTO: 14 MCG/MG CREAT (ref 0–30)
PHOSPHORUS: 3.7 MG/DL (ref 2.5–4.5)
POTASSIUM SERPL-SCNC: 5.3 MMOL/L (ref 3.5–5)
SODIUM BLD-SCNC: 136 MMOL/L (ref 132–146)

## 2023-12-08 RX ORDER — LOSARTAN POTASSIUM 100 MG/1
100 TABLET ORAL DAILY
Qty: 30 TABLET | Refills: 11 | Status: SHIPPED | OUTPATIENT
Start: 2023-12-08

## 2023-12-08 RX ORDER — AMLODIPINE BESYLATE 5 MG/1
5 TABLET ORAL DAILY
Qty: 90 TABLET | Refills: 3 | Status: CANCELLED | OUTPATIENT
Start: 2023-12-08

## 2023-12-08 NOTE — TELEPHONE ENCOUNTER
Blood pressure check   Office /64 P 63  Reported home BP readings -145/81(12/7/23), 166/79, 165/89    Denies chest pain, palpitations, SOB     Amlodipine 5 mg once daily.     If continuing dose or changing, patient request refills to [facility]

## 2023-12-08 NOTE — TELEPHONE ENCOUNTER
[address] [facility] notified. She would like to know if patient should continue Amlodipine 5 mg or will dose go back to 2.5 mg? If she is to continue 5 mg, request 90 day supply. Amlodipine 5 mg omce daily, #90, 3 refills pended to [facility], if needed.

## 2023-12-10 DIAGNOSIS — E78.5 HYPERLIPIDEMIA, UNSPECIFIED HYPERLIPIDEMIA TYPE: ICD-10-CM

## 2023-12-11 ENCOUNTER — TELEPHONE (OUTPATIENT)
Dept: FAMILY MEDICINE CLINIC | Age: 88
End: 2023-12-11

## 2023-12-11 DIAGNOSIS — R80.9 MICROALBUMINURIA: Primary | ICD-10-CM

## 2023-12-11 DIAGNOSIS — I10 PRIMARY HYPERTENSION: ICD-10-CM

## 2023-12-11 DIAGNOSIS — E87.5 HYPERKALEMIA: ICD-10-CM

## 2023-12-11 RX ORDER — AMLODIPINE BESYLATE 5 MG/1
5 TABLET ORAL DAILY
COMMUNITY
End: 2023-12-11 | Stop reason: SDUPTHER

## 2023-12-11 NOTE — TELEPHONE ENCOUNTER
----- Message from Ailin Suh DO sent at 12/8/2023  4:26 PM EST -----  Renal function is improved. There is microalbuminuria and this should be monitored through the year. I understand she is 8 years old but we can continue to preserve renal function in this patient. Switching to valartan can help.   I would want another microalbumin done 1 month after valsartan initiation  I would like another BMP to evaluate potassium level in 2 weeks of therapy with valartan     Please reddy these up  thanks

## 2023-12-11 NOTE — TELEPHONE ENCOUNTER
Medication request for Amlodipine 5 mg tablet #90 to [facility]  Patient is currently taking two of the Amlodipine 2.5 mg tablets     Last Appointment   6/6/2023  Next Appointment  12/20/2023

## 2023-12-12 RX ORDER — AMLODIPINE BESYLATE 5 MG/1
5 TABLET ORAL DAILY
Qty: 90 TABLET | Refills: 3 | Status: SHIPPED | OUTPATIENT
Start: 2023-12-12

## 2023-12-12 RX ORDER — SIMVASTATIN 20 MG
20 TABLET ORAL NIGHTLY
Qty: 90 TABLET | Refills: 3 | Status: SHIPPED | OUTPATIENT
Start: 2023-12-12

## 2023-12-21 LAB — INR BLD: 2.4

## 2023-12-26 ENCOUNTER — ANTI-COAG VISIT (OUTPATIENT)
Dept: FAMILY MEDICINE CLINIC | Age: 88
End: 2023-12-26
Payer: MEDICARE

## 2023-12-26 DIAGNOSIS — Z79.01 LONG TERM (CURRENT) USE OF ANTICOAGULANTS: Primary | ICD-10-CM

## 2023-12-26 PROCEDURE — 93793 ANTICOAG MGMT PT WARFARIN: CPT | Performed by: SURGERY

## 2023-12-26 NOTE — PROGRESS NOTES
12/21/23 INR 2.4 Coumadin 5 mg M, T, W, F , and 2.5 mg all other days  Per Dr Franny De Leon: Stable recheck in week. [place] notified.

## 2023-12-29 LAB — INR BLD: 2

## 2024-01-02 ENCOUNTER — ANTI-COAG VISIT (OUTPATIENT)
Dept: FAMILY MEDICINE CLINIC | Age: 89
End: 2024-01-02
Payer: MEDICARE

## 2024-01-02 DIAGNOSIS — Z79.01 LONG TERM (CURRENT) USE OF ANTICOAGULANTS: Primary | ICD-10-CM

## 2024-01-02 PROCEDURE — 93793 ANTICOAG MGMT PT WARFARIN: CPT | Performed by: SURGERY

## 2024-01-02 NOTE — PROGRESS NOTES
12/29/23 INR 2.0 Coumadin 5 mg M, T, W, F , and 2.5 mg all other days.  Per Dr Zheng Love recheck in week Kya notified.

## 2024-01-05 ENCOUNTER — ANTI-COAG VISIT (OUTPATIENT)
Dept: FAMILY MEDICINE CLINIC | Age: 89
End: 2024-01-05

## 2024-01-05 DIAGNOSIS — Z79.01 LONG TERM (CURRENT) USE OF ANTICOAGULANTS: Primary | ICD-10-CM

## 2024-01-05 LAB — INR BLD: 2.1

## 2024-01-05 NOTE — PROGRESS NOTES
1/5/24 INR 2.1 Coumadin 5 mg M, T, W, F , and 2.5 mg all other days  Per Dr Calzada: Stable no change recheck at next scheduled interval

## 2024-01-11 ENCOUNTER — ANTI-COAG VISIT (OUTPATIENT)
Dept: FAMILY MEDICINE CLINIC | Age: 89
End: 2024-01-11
Payer: MEDICARE

## 2024-01-11 DIAGNOSIS — Z79.01 LONG TERM (CURRENT) USE OF ANTICOAGULANTS: Primary | ICD-10-CM

## 2024-01-11 LAB — INR BLD: 2.6

## 2024-01-11 PROCEDURE — 93793 ANTICOAG MGMT PT WARFARIN: CPT | Performed by: SURGERY

## 2024-01-11 NOTE — PROGRESS NOTES
1/11/24 INR 2.6 Coumadin 5 mg M, T, W, F , and 2.5 mg all other days.    Per Dr Calzada: Stable no change check in week if home inr   Kya notified

## 2024-01-17 ENCOUNTER — ANTI-COAG VISIT (OUTPATIENT)
Dept: FAMILY MEDICINE CLINIC | Age: 89
End: 2024-01-17
Payer: MEDICARE

## 2024-01-17 DIAGNOSIS — Z79.01 LONG TERM (CURRENT) USE OF ANTICOAGULANTS: Primary | ICD-10-CM

## 2024-01-17 LAB — INR BLD: 2.4

## 2024-01-17 PROCEDURE — 93793 ANTICOAG MGMT PT WARFARIN: CPT | Performed by: SURGERY

## 2024-01-17 NOTE — PROGRESS NOTES
1/17/2024 INR 2.4 Coumadin 5 mg M, T, W, F , and 2.5 mg all other days.  Per Dr Zheng Love no change recheck in week (home monitor)  Kya notified

## 2024-01-25 ENCOUNTER — TELEPHONE (OUTPATIENT)
Dept: FAMILY MEDICINE CLINIC | Age: 89
End: 2024-01-25

## 2024-01-25 ENCOUNTER — ANTI-COAG VISIT (OUTPATIENT)
Dept: FAMILY MEDICINE CLINIC | Age: 89
End: 2024-01-25
Payer: MEDICARE

## 2024-01-25 DIAGNOSIS — Z79.01 LONG TERM (CURRENT) USE OF ANTICOAGULANTS: Primary | ICD-10-CM

## 2024-01-25 LAB
INR BLD: 1.1
INR BLD: 1.9

## 2024-01-25 PROCEDURE — 93793 ANTICOAG MGMT PT WARFARIN: CPT | Performed by: SURGERY

## 2024-01-25 NOTE — PROGRESS NOTES
1/25/2024 INR 1.1 Coumadin 5 mg M, T, W, F , and 2.5 mg all other days.  Patient missed 5 mg dose yesterday(1/24/2024)  No other changes, reported by Kya Boland advised to recheck today.      1/25/2024(4:25 PM) INR 1.9 Coumadin 5 mg M, T, W, F , and 2.5 mg all other days. Patient missed 5 mg dose(1/24/24). Per Dr Calzada, no changes. Recheck in 1 week. Kya notified.

## 2024-01-31 ENCOUNTER — TELEPHONE (OUTPATIENT)
Dept: FAMILY MEDICINE CLINIC | Age: 89
End: 2024-01-31

## 2024-01-31 ENCOUNTER — ANTI-COAG VISIT (OUTPATIENT)
Dept: FAMILY MEDICINE CLINIC | Age: 89
End: 2024-01-31
Payer: MEDICARE

## 2024-01-31 DIAGNOSIS — Z79.01 LONG TERM (CURRENT) USE OF ANTICOAGULANTS: Primary | ICD-10-CM

## 2024-01-31 DIAGNOSIS — M17.0 PRIMARY OSTEOARTHRITIS OF BOTH KNEES: ICD-10-CM

## 2024-01-31 DIAGNOSIS — E78.5 HYPERLIPIDEMIA, UNSPECIFIED HYPERLIPIDEMIA TYPE: ICD-10-CM

## 2024-01-31 DIAGNOSIS — I48.91 ATRIAL FIBRILLATION, UNSPECIFIED TYPE (HCC): ICD-10-CM

## 2024-01-31 DIAGNOSIS — R54 AGE-RELATED PHYSICAL DEBILITY: Primary | ICD-10-CM

## 2024-01-31 DIAGNOSIS — I10 PRIMARY HYPERTENSION: ICD-10-CM

## 2024-01-31 DIAGNOSIS — H40.9 GLAUCOMA, UNSPECIFIED GLAUCOMA TYPE, UNSPECIFIED LATERALITY: ICD-10-CM

## 2024-01-31 LAB — INR BLD: 1.8

## 2024-01-31 PROCEDURE — 93793 ANTICOAG MGMT PT WARFARIN: CPT | Performed by: SURGERY

## 2024-01-31 NOTE — PROGRESS NOTES
1/31/2024 INR 1.8 Coumadin 5 mg M, T, W, F , and 2.5 mg all other days.      2.5 mg (2.5 mg x 1) every Sun, Thu     5 mg (5 mg x 1) all other days     Weekly warfarin total: 30 mg     Recheck in week     Kya notified

## 2024-01-31 NOTE — TELEPHONE ENCOUNTER
Kya is requesting visiting nurse to come to the home to check on patient. To check vitals and to just check on patient weekly.     She would also like to see if there is a service that does home blood draws.     Patient just does not have the energy to go out of the home.

## 2024-02-07 ENCOUNTER — ANTI-COAG VISIT (OUTPATIENT)
Dept: FAMILY MEDICINE CLINIC | Age: 89
End: 2024-02-07
Payer: MEDICARE

## 2024-02-07 DIAGNOSIS — Z79.01 LONG TERM (CURRENT) USE OF ANTICOAGULANTS: Primary | ICD-10-CM

## 2024-02-07 LAB — INR BLD: 3

## 2024-02-07 PROCEDURE — 93793 ANTICOAG MGMT PT WARFARIN: CPT | Performed by: SURGERY

## 2024-02-07 NOTE — PROGRESS NOTES
2/7/2024 INR 3.0 Coumadin 2.5 mg every Sun, Thu; 5 mg all other days.  Per Dr Calzada:   Stable no change recheck in week   Kya notified

## 2024-02-15 ENCOUNTER — ANTI-COAG VISIT (OUTPATIENT)
Dept: FAMILY MEDICINE CLINIC | Age: 89
End: 2024-02-15
Payer: MEDICARE

## 2024-02-15 DIAGNOSIS — Z79.01 LONG TERM (CURRENT) USE OF ANTICOAGULANTS: Primary | ICD-10-CM

## 2024-02-15 LAB — INR BLD: 3.1

## 2024-02-15 PROCEDURE — 93793 ANTICOAG MGMT PT WARFARIN: CPT | Performed by: SURGERY

## 2024-02-15 NOTE — PROGRESS NOTES
2/15/2024 INR 3.1 Coumadin 2.5 mg every Sun, Thu; 5 mg all other days  Per Dr Calzada: Stable no change recheck in week. Kya notified

## 2024-02-21 LAB — INR BLD: 2.1

## 2024-02-22 ENCOUNTER — ANTI-COAG VISIT (OUTPATIENT)
Dept: FAMILY MEDICINE CLINIC | Age: 89
End: 2024-02-22
Payer: MEDICARE

## 2024-02-22 DIAGNOSIS — Z79.01 LONG TERM (CURRENT) USE OF ANTICOAGULANTS: Primary | ICD-10-CM

## 2024-02-22 PROCEDURE — 93793 ANTICOAG MGMT PT WARFARIN: CPT | Performed by: SURGERY

## 2024-02-22 NOTE — PROGRESS NOTES
2/22/2024 INR 2.1 Coumadin 2.5 mg every Sun, Thu; 5 mg all other days    Per Dr Calzada:   Stable no change   Kya notified

## 2024-02-29 ENCOUNTER — ANTI-COAG VISIT (OUTPATIENT)
Dept: FAMILY MEDICINE CLINIC | Age: 89
End: 2024-02-29
Payer: MEDICARE

## 2024-02-29 DIAGNOSIS — Z79.01 LONG TERM (CURRENT) USE OF ANTICOAGULANTS: Primary | ICD-10-CM

## 2024-02-29 LAB — INR BLD: 2.6

## 2024-02-29 PROCEDURE — 93793 ANTICOAG MGMT PT WARFARIN: CPT | Performed by: SURGERY

## 2024-02-29 NOTE — PROGRESS NOTES
2/29/2024 INR 2.6 Coumadin 2.5 mg every Sun, Thu; 5 mg all other days.   Per Dr Calzada: Stable no change. Kya notified

## 2024-03-07 ENCOUNTER — ANTI-COAG VISIT (OUTPATIENT)
Dept: FAMILY MEDICINE CLINIC | Age: 89
End: 2024-03-07

## 2024-03-07 DIAGNOSIS — Z79.01 LONG TERM (CURRENT) USE OF ANTICOAGULANTS: Primary | ICD-10-CM

## 2024-03-07 LAB — INR BLD: 2.8

## 2024-03-07 NOTE — PROGRESS NOTES
3/7/2024 INR 2.8   Coumadin 2.5 mg every Sun, Thu; 5 mg all other days.  Per Dr Calzada:   Stable no change   Kya notified

## 2024-03-14 ENCOUNTER — ANTI-COAG VISIT (OUTPATIENT)
Dept: FAMILY MEDICINE CLINIC | Age: 89
End: 2024-03-14
Payer: MEDICARE

## 2024-03-14 DIAGNOSIS — Z79.01 LONG TERM (CURRENT) USE OF ANTICOAGULANTS: Primary | ICD-10-CM

## 2024-03-14 LAB — INR BLD: 3.2

## 2024-03-14 PROCEDURE — 93793 ANTICOAG MGMT PT WARFARIN: CPT | Performed by: SURGERY

## 2024-03-14 NOTE — PROGRESS NOTES
3/14/2024 INR 3.2 Coumadin 2.5 mg every Sun, Thu; 5 mg all other days    Spoke with Kya, no missed doses, no medication or diet changes, no illness    Per Dr Calzada:Stable no change. Kya notified

## 2024-03-21 LAB — INR BLD: 3.1

## 2024-03-22 ENCOUNTER — ANTI-COAG VISIT (OUTPATIENT)
Dept: FAMILY MEDICINE CLINIC | Age: 89
End: 2024-03-22
Payer: MEDICARE

## 2024-03-22 DIAGNOSIS — Z79.01 LONG TERM (CURRENT) USE OF ANTICOAGULANTS: Primary | ICD-10-CM

## 2024-03-22 PROCEDURE — 93793 ANTICOAG MGMT PT WARFARIN: CPT | Performed by: SURGERY

## 2024-03-22 NOTE — PROGRESS NOTES
3/21/2024 INR 3.1 Coumadin 2.5 mg every Sun, Thu; 5 mg all other days  Stable no change, edin notified

## 2024-03-29 ENCOUNTER — ANTI-COAG VISIT (OUTPATIENT)
Dept: FAMILY MEDICINE CLINIC | Age: 89
End: 2024-03-29
Payer: MEDICARE

## 2024-03-29 DIAGNOSIS — Z79.01 LONG TERM (CURRENT) USE OF ANTICOAGULANTS: Primary | ICD-10-CM

## 2024-03-29 LAB — INR BLD: 3.2

## 2024-03-29 PROCEDURE — 93793 ANTICOAG MGMT PT WARFARIN: CPT | Performed by: SURGERY

## 2024-03-29 NOTE — PROGRESS NOTES
3/29/2024 INR 3.2 Coumadin 2.5 mg every Sun, Thu; 5 mg all other days.   Per Dr Calzada:   Stable no change     Kya notified

## 2024-04-04 ENCOUNTER — ANTI-COAG VISIT (OUTPATIENT)
Dept: FAMILY MEDICINE CLINIC | Age: 89
End: 2024-04-04

## 2024-04-04 LAB — INR BLD: 3.3

## 2024-04-05 NOTE — PROGRESS NOTES
Tyrone Calzada, DO   to uNvia Byrnes MA       4/5/24  8:50 AM  Stable no change recheck in week    Pt daughter notified.

## 2024-04-05 NOTE — PROGRESS NOTES
Attempted to reach Farhan Jacome (on HIPAA).  Left voicemail stating to return call to office.  Please give INR results and instructions if Farhan or Kya call office back.  Thank you

## 2024-04-15 ENCOUNTER — ANTI-COAG VISIT (OUTPATIENT)
Dept: FAMILY MEDICINE CLINIC | Age: 89
End: 2024-04-15
Payer: MEDICARE

## 2024-04-15 DIAGNOSIS — Z79.01 LONG TERM (CURRENT) USE OF ANTICOAGULANTS: Primary | ICD-10-CM

## 2024-04-15 LAB
INR BLD: 1.9
INR BLD: 2.6

## 2024-04-15 PROCEDURE — 93793 ANTICOAG MGMT PT WARFARIN: CPT | Performed by: SURGERY

## 2024-04-15 NOTE — PROGRESS NOTES
4/15/2024 2.6 Coumadin 2.5 mg every Sun, Thu; 5 mg all other days    Per Kya  Stable no change   Kya notified    4/11/2024 1.9 Coumadin 2.5 mg every Sun, Thu; 5 mg all other days    Per Dr Calzada:   Repeat the inr again today     Kya notified

## 2024-04-22 ENCOUNTER — ANTI-COAG VISIT (OUTPATIENT)
Dept: FAMILY MEDICINE CLINIC | Age: 89
End: 2024-04-22
Payer: MEDICARE

## 2024-04-22 DIAGNOSIS — Z79.01 LONG TERM (CURRENT) USE OF ANTICOAGULANTS: Primary | ICD-10-CM

## 2024-04-22 LAB
INR BLD: 3.5
INR BLD: 3.6

## 2024-04-22 PROCEDURE — 93793 ANTICOAG MGMT PT WARFARIN: CPT | Performed by: SURGERY

## 2024-04-22 NOTE — PROGRESS NOTES
4/22/2024 INR 3.6 Coumadin 2.5 mg every Sun, Thu; 5 mg all other days    Spoke with Kya no changes to patients, medical health, no missed doses, no new medications.     Notified Kya to recheck INR today, she states she will try.

## 2024-04-22 NOTE — PROGRESS NOTES
Called Kya back, recheck INR 4/22/2024 3.5  Per Dr Calzada: Sun tue thurs 2.5mg, 5mg all other days, Recheck in week     Kya notified

## 2024-04-29 LAB — INR BLD: 2.5

## 2024-04-30 ENCOUNTER — ANTI-COAG VISIT (OUTPATIENT)
Dept: FAMILY MEDICINE CLINIC | Age: 89
End: 2024-04-30
Payer: MEDICARE

## 2024-04-30 DIAGNOSIS — Z79.01 LONG TERM (CURRENT) USE OF ANTICOAGULANTS: Primary | ICD-10-CM

## 2024-04-30 PROCEDURE — 93793 ANTICOAG MGMT PT WARFARIN: CPT | Performed by: SURGERY

## 2024-04-30 NOTE — PROGRESS NOTES
4/29/2024 INR 2.5 Sun tue thurs 2.5mg, 5mg all other days.  Per Dr Calzada: Stable no change. Left message to notify Kya

## 2024-05-06 LAB — INR BLD: 2.1

## 2024-05-07 ENCOUNTER — ANTI-COAG VISIT (OUTPATIENT)
Dept: FAMILY MEDICINE CLINIC | Age: 89
End: 2024-05-07
Payer: MEDICARE

## 2024-05-07 DIAGNOSIS — Z79.01 LONG TERM (CURRENT) USE OF ANTICOAGULANTS: Primary | ICD-10-CM

## 2024-05-07 PROCEDURE — 93793 ANTICOAG MGMT PT WARFARIN: CPT | Performed by: SURGERY

## 2024-05-07 NOTE — PROGRESS NOTES
5/6/2024 INR 2.1 Sun tue thurs 2.5mg, 5mg all other days. Per Dr Case   INR stable. Continue same dose.   Kya notified

## 2024-05-13 ENCOUNTER — ANTI-COAG VISIT (OUTPATIENT)
Dept: FAMILY MEDICINE CLINIC | Age: 89
End: 2024-05-13
Payer: MEDICARE

## 2024-05-13 DIAGNOSIS — Z79.01 LONG TERM (CURRENT) USE OF ANTICOAGULANTS: Primary | ICD-10-CM

## 2024-05-13 LAB — INR BLD: 2.1

## 2024-05-13 PROCEDURE — 93793 ANTICOAG MGMT PT WARFARIN: CPT | Performed by: SURGERY

## 2024-05-13 NOTE — PROGRESS NOTES
5/13/2024 INR 2.1 Sun tue thurs 2.5mg, 5mg all other days     Continue currentl dosing   Kya notified

## 2024-05-20 ENCOUNTER — ANTI-COAG VISIT (OUTPATIENT)
Dept: FAMILY MEDICINE CLINIC | Age: 89
End: 2024-05-20
Payer: MEDICARE

## 2024-05-20 DIAGNOSIS — Z79.01 LONG TERM (CURRENT) USE OF ANTICOAGULANTS: Primary | ICD-10-CM

## 2024-05-20 LAB — INR BLD: 2.1

## 2024-05-20 PROCEDURE — 93793 ANTICOAG MGMT PT WARFARIN: CPT | Performed by: SURGERY

## 2024-05-20 NOTE — PROGRESS NOTES
5/20/2024 INR 2.1 Sun tue thurs 2.5mg, 5mg all other days  Per Dr Calzada Stable no change  Kya notified

## 2024-05-27 LAB — INR BLD: 2.5

## 2024-05-29 ENCOUNTER — ANTI-COAG VISIT (OUTPATIENT)
Dept: FAMILY MEDICINE CLINIC | Age: 89
End: 2024-05-29
Payer: MEDICARE

## 2024-05-29 DIAGNOSIS — Z79.01 LONG TERM (CURRENT) USE OF ANTICOAGULANTS: Primary | ICD-10-CM

## 2024-05-29 PROCEDURE — 93793 ANTICOAG MGMT PT WARFARIN: CPT | Performed by: SURGERY

## 2024-05-29 NOTE — PROGRESS NOTES
5/27/2024 INR 2.5 Sun tue thurs 2.5mg, 5mg all other days   Per Dr Calzada:   Stable no change   Kya notified

## 2024-06-03 ENCOUNTER — ANTI-COAG VISIT (OUTPATIENT)
Dept: FAMILY MEDICINE CLINIC | Age: 89
End: 2024-06-03

## 2024-06-03 DIAGNOSIS — Z79.01 LONG TERM (CURRENT) USE OF ANTICOAGULANTS: Primary | ICD-10-CM

## 2024-06-03 LAB — INR BLD: 2.4

## 2024-06-03 NOTE — PROGRESS NOTES
6/3/2024 INR 2.4 Sun tue thurs 2.5mg, 5mg all other days Per Dr Calzada: Stable no change. Kya notified

## 2024-06-11 ENCOUNTER — ANTI-COAG VISIT (OUTPATIENT)
Dept: FAMILY MEDICINE CLINIC | Age: 89
End: 2024-06-11
Payer: MEDICARE

## 2024-06-11 DIAGNOSIS — Z79.01 LONG TERM (CURRENT) USE OF ANTICOAGULANTS: Primary | ICD-10-CM

## 2024-06-11 LAB — INR BLD: 3.1

## 2024-06-11 PROCEDURE — 93793 ANTICOAG MGMT PT WARFARIN: CPT | Performed by: SURGERY

## 2024-06-11 NOTE — PROGRESS NOTES
6/11/2024 INR 3.1 Sun tue thurs 2.5mg, 5mg all other days Per Dr Calzada: Stable no change, Kya notified

## 2024-06-17 ENCOUNTER — ANTI-COAG VISIT (OUTPATIENT)
Dept: FAMILY MEDICINE CLINIC | Age: 89
End: 2024-06-17
Payer: MEDICARE

## 2024-06-17 DIAGNOSIS — Z79.01 LONG TERM (CURRENT) USE OF ANTICOAGULANTS: Primary | ICD-10-CM

## 2024-06-17 LAB — INR BLD: 1.8

## 2024-06-17 PROCEDURE — 93793 ANTICOAG MGMT PT WARFARIN: CPT | Performed by: SURGERY

## 2024-06-18 ENCOUNTER — ANTI-COAG VISIT (OUTPATIENT)
Dept: FAMILY MEDICINE CLINIC | Age: 89
End: 2024-06-18
Payer: MEDICARE

## 2024-06-18 DIAGNOSIS — Z79.01 LONG TERM (CURRENT) USE OF ANTICOAGULANTS: Primary | ICD-10-CM

## 2024-06-18 LAB — INR BLD: 1.9

## 2024-06-18 PROCEDURE — 93793 ANTICOAG MGMT PT WARFARIN: CPT | Performed by: SURGERY

## 2024-06-18 NOTE — PROGRESS NOTES
6/18/2024 INR 1.9 Sun tue thurs 2.5mg, 5mg all other days    Spoke with Kya, patient did miss her Friday night dose(5 mg) on 6/14/24.    She denies any other factors, no new diets, otc medication or herbs, leafy greens, falls, bruising    She will resume her previous dose. Appointment Thursday 6/20/24    Per Dr Calzada: Take booster dose tonight (two of her normally scheduled coumadin) 5mg, Kya notified

## 2024-06-20 ENCOUNTER — OFFICE VISIT (OUTPATIENT)
Dept: FAMILY MEDICINE CLINIC | Age: 89
End: 2024-06-20
Payer: MEDICARE

## 2024-06-20 VITALS
BODY MASS INDEX: 25.95 KG/M2 | WEIGHT: 141 LBS | SYSTOLIC BLOOD PRESSURE: 134 MMHG | HEIGHT: 62 IN | HEART RATE: 62 BPM | OXYGEN SATURATION: 98 % | TEMPERATURE: 97.1 F | DIASTOLIC BLOOD PRESSURE: 62 MMHG

## 2024-06-20 DIAGNOSIS — I10 PRIMARY HYPERTENSION: ICD-10-CM

## 2024-06-20 DIAGNOSIS — E78.5 HYPERLIPIDEMIA, UNSPECIFIED HYPERLIPIDEMIA TYPE: ICD-10-CM

## 2024-06-20 DIAGNOSIS — N18.2 CKD (CHRONIC KIDNEY DISEASE) STAGE 2, GFR 60-89 ML/MIN: ICD-10-CM

## 2024-06-20 DIAGNOSIS — K21.9 GASTROESOPHAGEAL REFLUX DISEASE WITHOUT ESOPHAGITIS: ICD-10-CM

## 2024-06-20 DIAGNOSIS — Z00.00 MEDICARE ANNUAL WELLNESS VISIT, SUBSEQUENT: Primary | ICD-10-CM

## 2024-06-20 DIAGNOSIS — Z29.11 NEED FOR PROPHYLACTIC VACCINATION AND INOCULATION AGAINST RESPIRATORY SYNCYTIAL VIRUS (RSV): ICD-10-CM

## 2024-06-20 DIAGNOSIS — I48.91 ATRIAL FIBRILLATION, UNSPECIFIED TYPE (HCC): ICD-10-CM

## 2024-06-20 DIAGNOSIS — H40.9 GLAUCOMA, UNSPECIFIED GLAUCOMA TYPE, UNSPECIFIED LATERALITY: ICD-10-CM

## 2024-06-20 DIAGNOSIS — E78.00 PURE HYPERCHOLESTEROLEMIA: ICD-10-CM

## 2024-06-20 DIAGNOSIS — M19.91 PRIMARY OSTEOARTHRITIS, UNSPECIFIED SITE: ICD-10-CM

## 2024-06-20 PROBLEM — N18.31 ACUTE RENAL FAILURE SUPERIMPOSED ON STAGE 3A CHRONIC KIDNEY DISEASE, UNSPECIFIED ACUTE RENAL FAILURE TYPE (HCC): Status: ACTIVE | Noted: 2024-06-20

## 2024-06-20 PROBLEM — N17.9 ACUTE RENAL FAILURE SUPERIMPOSED ON STAGE 3A CHRONIC KIDNEY DISEASE, UNSPECIFIED ACUTE RENAL FAILURE TYPE (HCC): Status: ACTIVE | Noted: 2024-06-20

## 2024-06-20 PROCEDURE — 1123F ACP DISCUSS/DSCN MKR DOCD: CPT | Performed by: SURGERY

## 2024-06-20 PROCEDURE — G0439 PPPS, SUBSEQ VISIT: HCPCS | Performed by: SURGERY

## 2024-06-20 SDOH — ECONOMIC STABILITY: FOOD INSECURITY: WITHIN THE PAST 12 MONTHS, YOU WORRIED THAT YOUR FOOD WOULD RUN OUT BEFORE YOU GOT MONEY TO BUY MORE.: NEVER TRUE

## 2024-06-20 SDOH — ECONOMIC STABILITY: FOOD INSECURITY: WITHIN THE PAST 12 MONTHS, THE FOOD YOU BOUGHT JUST DIDN'T LAST AND YOU DIDN'T HAVE MONEY TO GET MORE.: NEVER TRUE

## 2024-06-20 SDOH — ECONOMIC STABILITY: INCOME INSECURITY: HOW HARD IS IT FOR YOU TO PAY FOR THE VERY BASICS LIKE FOOD, HOUSING, MEDICAL CARE, AND HEATING?: NOT HARD AT ALL

## 2024-06-20 ASSESSMENT — PATIENT HEALTH QUESTIONNAIRE - PHQ9
1. LITTLE INTEREST OR PLEASURE IN DOING THINGS: NOT AT ALL
SUM OF ALL RESPONSES TO PHQ9 QUESTIONS 1 & 2: 1
SUM OF ALL RESPONSES TO PHQ QUESTIONS 1-9: 1
2. FEELING DOWN, DEPRESSED OR HOPELESS: SEVERAL DAYS
SUM OF ALL RESPONSES TO PHQ QUESTIONS 1-9: 1

## 2024-06-20 NOTE — PATIENT INSTRUCTIONS
your medical record and screening and assessments performed today your provider may have ordered immunizations, labs, imaging, and/or referrals for you.  A list of these orders (if applicable) as well as your Preventive Care list are included within your After Visit Summary for your review.    Other Preventive Recommendations:    A preventive eye exam performed by an eye specialist is recommended every 1-2 years to screen for glaucoma; cataracts, macular degeneration, and other eye disorders.  A preventive dental visit is recommended every 6 months.  Try to get at least 150 minutes of exercise per week or 10,000 steps per day on a pedometer .  Order or download the FREE \"Exercise & Physical Activity: Your Everyday Guide\" from [employer]. Call 1-201.780.4413 or search [employer] online.  You need 6463-2713 mg of calcium and 7142-5069 IU of vitamin D per day. It is possible to meet your calcium requirement with diet alone, but a vitamin D supplement is usually necessary to meet this goal.  When exposed to the sun, use a sunscreen that protects against both UVA and UVB radiation with an SPF of 30 or greater. Reapply every 2 to 3 hours or after sweating, drying off with a towel, or swimming.  Always wear a seat belt when traveling in a car. Always wear a helmet when riding a bicycle or motorcycle.

## 2024-06-20 NOTE — PROGRESS NOTES
Medicare Annual Wellness Visit    Edwina Cantu is here for Medicare AWV    Assessment & Plan   Medicare annual wellness visit, subsequent  All personal, family, social, surgical, medical history is reviewed and updated with patient.  Allergies, medications updated.  List of specialists follows with updated.  DM/HM updated.    Need for prophylactic vaccination and inoculation against respiratory syncytial virus (RSV)  -     respiratory syncytial vaccine, adjuvanted (AREXVY) 120 MCG/0.5ML injection; Inject 0.5 mLs into the muscle once for 1 dose, Disp-0.5 mL, R-0Print  Atrial fibrillation, unspecified type (HCC)  -     CBC with Auto Differential; Standing  CKD (chronic kidney disease) stage 2, GFR 60-89 ml/min  -     Comprehensive Metabolic Panel; Standing  Pure hypercholesterolemia  -     Lipid, Fasting; Standing  Hyperlipidemia, unspecified hyperlipidemia type  Gastroesophageal reflux disease without esophagitis  Primary hypertension  Primary osteoarthritis, unspecified site  Glaucoma, unspecified glaucoma type, unspecified laterality  Recommendations for Preventive Services Due: see orders and patient instructions/AVS.  Recommended screening schedule for the next 5-10 years is provided to the patient in written form: see Patient Instructions/AVS.     Return for Medicare Annual Wellness Visit in 1 year.     Subjective       Urinary frequency / nocturia   She is having urinary frequency at night  She does not usually leak if she coughs or sneezes  Very rare instances of urinary incontinence   She does have fluids, will take protein drink and will have water right up to bed time  See AVS  Bladder training     Atrial fibrillation, unspecified type (HCC)  -     POCT INR  -     CBC with Auto Differential; Future  -     Comprehensive Metabolic Panel; Future  Stable  Cardiology notes reviewed with patient  No imaging planned for known AS murmur  Conservative / expectant mgmt  No bleeding s/s     Rate control with toprol

## 2024-06-21 ENCOUNTER — ANTI-COAG VISIT (OUTPATIENT)
Dept: FAMILY MEDICINE CLINIC | Age: 89
End: 2024-06-21

## 2024-06-21 LAB — INR BLD: 2.6

## 2024-06-27 ENCOUNTER — ANTI-COAG VISIT (OUTPATIENT)
Dept: FAMILY MEDICINE CLINIC | Age: 89
End: 2024-06-27
Payer: MEDICARE

## 2024-06-27 DIAGNOSIS — Z79.01 LONG TERM (CURRENT) USE OF ANTICOAGULANTS: Primary | ICD-10-CM

## 2024-06-27 LAB — INR BLD: 2.5

## 2024-06-27 PROCEDURE — 93793 ANTICOAG MGMT PT WARFARIN: CPT | Performed by: SURGERY

## 2024-06-27 NOTE — PROGRESS NOTES
6/27/2024 INR 2.5 Sun tue thurs 2.5mg, 5mg all other days.  Per Dr Calzada:Stable no change. Kya notified

## 2024-07-04 LAB — INR BLD: 2.5

## 2024-07-08 ENCOUNTER — ANTI-COAG VISIT (OUTPATIENT)
Dept: FAMILY MEDICINE CLINIC | Age: 89
End: 2024-07-08
Payer: MEDICARE

## 2024-07-08 DIAGNOSIS — Z79.01 LONG TERM (CURRENT) USE OF ANTICOAGULANTS: Primary | ICD-10-CM

## 2024-07-08 PROCEDURE — 93793 ANTICOAG MGMT PT WARFARIN: CPT | Performed by: SURGERY

## 2024-07-08 NOTE — PROGRESS NOTES
7/4/2024 INR 2.5 Sun tue thurs 2.5mg, 5mg all other days. Per Dr Calzada:stable no change. Left detailed message to notify Kya

## 2024-07-11 ENCOUNTER — ANTI-COAG VISIT (OUTPATIENT)
Dept: FAMILY MEDICINE CLINIC | Age: 89
End: 2024-07-11

## 2024-07-11 DIAGNOSIS — Z79.01 LONG TERM (CURRENT) USE OF ANTICOAGULANTS: Primary | ICD-10-CM

## 2024-07-11 LAB — INR BLD: 2.6

## 2024-07-11 NOTE — PROGRESS NOTES
7/11/2024 INR 2.6 Sun tue thurs 2.5mg, 5mg all other days. Per Dr Calzada: Stable no change. Kya notified

## 2024-07-18 ENCOUNTER — ANTI-COAG VISIT (OUTPATIENT)
Dept: FAMILY MEDICINE CLINIC | Age: 89
End: 2024-07-18

## 2024-07-18 DIAGNOSIS — Z79.01 LONG TERM (CURRENT) USE OF ANTICOAGULANTS: Primary | ICD-10-CM

## 2024-07-18 LAB — INR BLD: 2.3

## 2024-07-18 NOTE — PROGRESS NOTES
7/18/2024 INR 2.3 Sun tue thurs 2.5mg, 5mg all other days. Per Dr Calzada:Per Dr Calzada Stable no change recheck in week. Kya notified

## 2024-07-25 ENCOUNTER — ANTI-COAG VISIT (OUTPATIENT)
Dept: FAMILY MEDICINE CLINIC | Age: 89
End: 2024-07-25
Payer: MEDICARE

## 2024-07-25 DIAGNOSIS — Z79.01 LONG TERM (CURRENT) USE OF ANTICOAGULANTS: Primary | ICD-10-CM

## 2024-07-25 LAB — INTERNATIONAL NORMALIZATION RATIO, POC: 3

## 2024-07-25 PROCEDURE — 85610 PROTHROMBIN TIME: CPT | Performed by: SURGERY

## 2024-08-01 ENCOUNTER — ANTI-COAG VISIT (OUTPATIENT)
Dept: FAMILY MEDICINE CLINIC | Age: 89
End: 2024-08-01

## 2024-08-01 DIAGNOSIS — Z79.01 LONG TERM (CURRENT) USE OF ANTICOAGULANTS: Primary | ICD-10-CM

## 2024-08-01 LAB — INR BLD: 2.8

## 2024-08-01 NOTE — PROGRESS NOTES
8/1/2024 INR 2.8 Sun tue thurs 2.5mg, 5mg all other days  Per Dr Calzada: Stable no change recheck in week. Kya notified

## 2024-08-03 ENCOUNTER — APPOINTMENT (OUTPATIENT)
Dept: GENERAL RADIOLOGY | Age: 89
End: 2024-08-03
Payer: MEDICARE

## 2024-08-03 ENCOUNTER — HOSPITAL ENCOUNTER (EMERGENCY)
Age: 89
Discharge: HOME | End: 2024-08-03
Payer: MEDICARE

## 2024-08-03 VITALS
RESPIRATION RATE: 20 BRPM | HEART RATE: 93 BPM | TEMPERATURE: 98.1 F | SYSTOLIC BLOOD PRESSURE: 118 MMHG | OXYGEN SATURATION: 99 % | DIASTOLIC BLOOD PRESSURE: 71 MMHG

## 2024-08-03 DIAGNOSIS — S22.41XA CLOSED FRACTURE OF MULTIPLE RIBS OF RIGHT SIDE, INITIAL ENCOUNTER: Primary | ICD-10-CM

## 2024-08-03 PROCEDURE — 71101 X-RAY EXAM UNILAT RIBS/CHEST: CPT

## 2024-08-03 PROCEDURE — 99211 OFF/OP EST MAY X REQ PHY/QHP: CPT

## 2024-08-03 NOTE — DISCHARGE INSTRUCTIONS
Tylenol 650 to 1000 mg every 8 hours as needed for pain/fever.  Topical medications ie. Lidocaine patch OTC as directed    Xray shows: Mildly displaced fractures involving the right anterolateral 8th and 9th  ribs.   Monitor  Check pulse ox  Try not to take shallow breaths   Activity as tolerated  If symptoms worsen go to the emergency department

## 2024-08-03 NOTE — ED PROVIDER NOTES
[facility] URGENT CARE  EMERGENCY DEPARTMENT ENCOUNTER        NAME: Edwina Cantu  :  1921  MRN:  [id number]  Date of evaluation: 8/3/2024  Provider: Frandy Doe PA-C  PCP: Tyrone Calzada DO  Note Started : 12:47 PM EDT 8/3/24    Chief Complaint: Fall (Fell on Monday night on carpet  left side  saying pain is getting worse in rib area    )      This is a 89 year old female that presents to  with family. She fell on 24 onto her right side. She denies head or neck injury. Family states there was not much ribcage pain at all and they did not see any new bruises on her body. Yesterday she stated some more pain to the right lower lateral ribcage area and they noticed some bruising there so they brought her into  for evaluation. There is some pain with movement and breathing. No coughing up blood. Patient denies abdominal pain. No numbness or tingling.On first contact with patient, the patient is in no acute distress.           Review of Systems  Pertinent positives and negatives are stated within HPI, all other systems reviewed and are negative.     Allergies: Patient has no known allergies.     --------------------------------------------- PAST HISTORY ---------------------------------------------  Past Medical History:  has a past medical history of Atrial fibrillation (HCC), Cancer (HCC), Depression, GERD (gastroesophageal reflux disease), Hiatal hernia, Hyperlipidemia, Hypertension, Mitral valve regurgitation, and Osteoarthritis.    Past Surgical History:  has no past surgical history on file.    Social History:  reports that she quit smoking about 42 years ago. Her smoking use included cigarettes. She started smoking about 72 years ago. She has a 15.1 pack-year smoking history. She has never used smokeless tobacco. She reports that she does not currently use alcohol. She reports that she does not use drugs.    Family History: family history includes Heart Attack (age of

## 2024-08-06 ENCOUNTER — TELEPHONE (OUTPATIENT)
Dept: FAMILY MEDICINE CLINIC | Age: 89
End: 2024-08-06

## 2024-08-06 NOTE — TELEPHONE ENCOUNTER
----- Message from Tyrone Calzada DO sent at 8/5/2024  4:15 PM EDT -----  See how her pain is controlled, if not well controlled we can get her an apt with the pain mgmt doctor for an injection  ----- Message -----  From: Discharge Provider, Automatic  Sent: 8/4/2024   1:34 AM EDT  To: Tyrone Calzada DO

## 2024-08-06 NOTE — TELEPHONE ENCOUNTER
Spoke with Kya.   She states patient notified her other daughter that she is in less pain yesterday. Only hurts during certain movements.   She has been taking two extra strength Tylenol at least once per day and using topical lidocaine.     She is currently using a walker around the house.   Kya will talk with patient to see if she would like to see pain management and call us back.

## 2024-08-07 LAB
INR BLD: 3.4
INR BLD: NORMAL
PROTHROMBIN TIME: NORMAL

## 2024-08-09 ENCOUNTER — ANTI-COAG VISIT (OUTPATIENT)
Dept: FAMILY MEDICINE CLINIC | Age: 89
End: 2024-08-09

## 2024-08-14 ENCOUNTER — ANTI-COAG VISIT (OUTPATIENT)
Dept: FAMILY MEDICINE CLINIC | Age: 89
End: 2024-08-14
Payer: MEDICARE

## 2024-08-14 LAB
INR BLD: 3.5
INTERNATIONAL NORMALIZATION RATIO, POC: 3.6

## 2024-08-14 PROCEDURE — 85610 PROTHROMBIN TIME: CPT | Performed by: SURGERY

## 2024-08-14 NOTE — PROGRESS NOTES
8/14/2024 INR 3.5 Sun tue thurs 2.5mg, 5mg all other days.      Per Dr Calzada: Hold tonight's dose, continue dosing scheme starting tomorrow, recheck in 3 days. Kya notified

## 2024-08-14 NOTE — PROGRESS NOTES
8/14/2024 INR 3.6 Sun tue thurs 2.5mg, 5mg all other days.   Patient taking 2 extra strength tylenol due to recent fall.    Kya notified to recheck today

## 2024-08-19 ENCOUNTER — ANTI-COAG VISIT (OUTPATIENT)
Dept: FAMILY MEDICINE CLINIC | Age: 89
End: 2024-08-19

## 2024-08-19 DIAGNOSIS — Z79.01 LONG TERM (CURRENT) USE OF ANTICOAGULANTS: Primary | ICD-10-CM

## 2024-08-19 LAB — INR BLD: 2.6

## 2024-08-19 NOTE — PROGRESS NOTES
8/19/2024 INR 2.6 Coumadin 5mg HELD on Wed 8/14/24. resumed Sun tue thurs 2.5mg, 5mg all other days.     Per Dr Calzada: Stable continue current dosing scheme recheck in  week. Kya notified

## 2024-08-26 ENCOUNTER — ANTI-COAG VISIT (OUTPATIENT)
Dept: FAMILY MEDICINE CLINIC | Age: 89
End: 2024-08-26

## 2024-08-26 LAB — INR BLD: 3

## 2024-08-26 NOTE — PROGRESS NOTES
8/26/2021 INR 3.0  Sun tue thurs 2.5mg, 5mg all other days  Per Dr Case: INR stable. Continue same dosing and recheck 1 week.  Kya notified

## 2024-09-02 LAB — INR BLD: 3.4

## 2024-09-03 ENCOUNTER — ANTI-COAG VISIT (OUTPATIENT)
Dept: FAMILY MEDICINE CLINIC | Age: 89
End: 2024-09-03

## 2024-09-03 LAB — INR BLD: 3.3

## 2024-09-03 NOTE — PROGRESS NOTES
9/3/2024 INR 3.3 Sun tue thurs 2.5mg, 5mg all other days  9/2/2024 INR 3.4 Sun tue thurs 2.5mg, 5mg all other days    Kya notified to recheck.   No changes to diet, no recent illness    Per Dr Calzada: Stable no change recheck in week.Kya notified

## 2024-09-09 ENCOUNTER — ANTI-COAG VISIT (OUTPATIENT)
Dept: FAMILY MEDICINE CLINIC | Age: 89
End: 2024-09-09

## 2024-09-09 DIAGNOSIS — Z79.01 LONG TERM (CURRENT) USE OF ANTICOAGULANTS: Primary | ICD-10-CM

## 2024-09-09 LAB — INR BLD: 2.3

## 2024-09-16 ENCOUNTER — ANTI-COAG VISIT (OUTPATIENT)
Dept: FAMILY MEDICINE CLINIC | Age: 89
End: 2024-09-16

## 2024-09-16 DIAGNOSIS — Z79.01 LONG TERM (CURRENT) USE OF ANTICOAGULANTS: Primary | ICD-10-CM

## 2024-09-16 LAB — INR BLD: 2.6

## 2024-09-23 ENCOUNTER — ANTI-COAG VISIT (OUTPATIENT)
Dept: FAMILY MEDICINE CLINIC | Age: 89
End: 2024-09-23
Payer: MEDICARE

## 2024-09-23 DIAGNOSIS — Z79.01 LONG TERM (CURRENT) USE OF ANTICOAGULANTS: Primary | ICD-10-CM

## 2024-09-23 LAB — INR BLD: 2.3

## 2024-09-23 PROCEDURE — 93793 ANTICOAG MGMT PT WARFARIN: CPT | Performed by: SURGERY

## 2024-10-01 ENCOUNTER — ANTI-COAG VISIT (OUTPATIENT)
Dept: FAMILY MEDICINE CLINIC | Age: 89
End: 2024-10-01
Payer: MEDICARE

## 2024-10-01 DIAGNOSIS — Z79.01 LONG TERM (CURRENT) USE OF ANTICOAGULANTS: Primary | ICD-10-CM

## 2024-10-01 LAB — INR BLD: 3.1

## 2024-10-01 PROCEDURE — 93793 ANTICOAG MGMT PT WARFARIN: CPT | Performed by: SURGERY

## 2024-10-07 LAB — INR BLD: 3.4

## 2024-10-08 ENCOUNTER — ANTI-COAG VISIT (OUTPATIENT)
Dept: FAMILY MEDICINE CLINIC | Age: 89
End: 2024-10-08
Payer: MEDICARE

## 2024-10-08 DIAGNOSIS — Z79.01 LONG TERM (CURRENT) USE OF ANTICOAGULANTS: Primary | ICD-10-CM

## 2024-10-08 PROCEDURE — 93793 ANTICOAG MGMT PT WARFARIN: CPT | Performed by: SURGERY

## 2024-10-08 NOTE — PROGRESS NOTES
10/7/2024 INR 3.4  Sun tue thurs 2.5mg, 5mg all other days.  Per Dr Calzada: Stable no change recheck next scheduled interval  Kya notified

## 2024-10-14 ENCOUNTER — ANTI-COAG VISIT (OUTPATIENT)
Dept: FAMILY MEDICINE CLINIC | Age: 89
End: 2024-10-14

## 2024-10-14 DIAGNOSIS — Z79.01 LONG TERM (CURRENT) USE OF ANTICOAGULANTS: Primary | ICD-10-CM

## 2024-10-14 LAB — INR BLD: 3.4

## 2024-10-14 NOTE — PROGRESS NOTES
10/14/2024 INR 3.4 Sun tue thurs 2.5mg, 5mg all other days.  Per Dr Calzada: Stable no change recheck in week. Kya notified

## 2024-10-21 ENCOUNTER — ANTI-COAG VISIT (OUTPATIENT)
Dept: FAMILY MEDICINE CLINIC | Age: 89
End: 2024-10-21
Payer: MEDICARE

## 2024-10-21 DIAGNOSIS — Z79.01 LONG TERM (CURRENT) USE OF ANTICOAGULANTS: Primary | ICD-10-CM

## 2024-10-21 LAB — INR BLD: 3.2

## 2024-10-21 PROCEDURE — 93793 ANTICOAG MGMT PT WARFARIN: CPT | Performed by: SURGERY

## 2024-10-22 ENCOUNTER — TELEPHONE (OUTPATIENT)
Dept: FAMILY MEDICINE CLINIC | Age: 89
End: 2024-10-22

## 2024-10-22 NOTE — TELEPHONE ENCOUNTER
Spoke with Kya, patients daughter.   She wanted to know since patient is 89 yo, should she still get covid and flu vaccines?   If so, can she get them on the same day?

## 2024-10-23 NOTE — TELEPHONE ENCOUNTER
Please inform patient's daughter that that is patient's choice.  If people still visit with her and she still is around other people, she should continue getting her vaccinations.  I do not recommend them getting done on the same day.  It is best to space them out at least a week apart, Flu shot being first.

## 2024-10-28 ENCOUNTER — ANTI-COAG VISIT (OUTPATIENT)
Dept: FAMILY MEDICINE CLINIC | Age: 89
End: 2024-10-28
Payer: MEDICARE

## 2024-10-28 DIAGNOSIS — Z79.01 LONG TERM (CURRENT) USE OF ANTICOAGULANTS: Primary | ICD-10-CM

## 2024-10-28 LAB — INR BLD: 1.7

## 2024-10-28 PROCEDURE — 93793 ANTICOAG MGMT PT WARFARIN: CPT | Performed by: SURGERY

## 2024-10-28 NOTE — PROGRESS NOTES
Spoke with Kya, patient did miss one sat dose(5 mg) 2 days ago  No changes to diet, no recent falls/bruising/bleeding, no new medications, no diarrhea, no sickness    She will recheck on 10/31/2024

## 2024-10-29 NOTE — PROGRESS NOTES
Recheck in week instead then   Will make any changes based off that   She can have a booster dose Tuesday night of 5mg total (from her normal 2.5mg)   Kya notified

## 2024-11-04 ENCOUNTER — ANTI-COAG VISIT (OUTPATIENT)
Dept: FAMILY MEDICINE CLINIC | Age: 89
End: 2024-11-04

## 2024-11-04 DIAGNOSIS — Z79.01 LONG TERM (CURRENT) USE OF ANTICOAGULANTS: Primary | ICD-10-CM

## 2024-11-04 LAB — INR BLD: 2.7

## 2024-11-04 NOTE — PROGRESS NOTES
11/4/2024 INR 2.7 Sun tues thurs 2.5mg, 5mg all other days. Booster dose(5mg) taken on Tuesday. Per Dr Calzada: Stable no change recheck in week Kya notified in person

## 2024-11-12 ENCOUNTER — ANTI-COAG VISIT (OUTPATIENT)
Dept: FAMILY MEDICINE CLINIC | Age: 89
End: 2024-11-12

## 2024-11-12 ENCOUNTER — NURSE ONLY (OUTPATIENT)
Dept: FAMILY MEDICINE CLINIC | Age: 89
End: 2024-11-12
Payer: MEDICARE

## 2024-11-12 ENCOUNTER — TELEPHONE (OUTPATIENT)
Dept: FAMILY MEDICINE CLINIC | Age: 89
End: 2024-11-12

## 2024-11-12 DIAGNOSIS — Z79.01 LONG TERM (CURRENT) USE OF ANTICOAGULANTS: Primary | ICD-10-CM

## 2024-11-12 DIAGNOSIS — Z23 NEED FOR IMMUNIZATION AGAINST INFLUENZA: Primary | ICD-10-CM

## 2024-11-12 DIAGNOSIS — H61.23 BILATERAL IMPACTED CERUMEN: Primary | ICD-10-CM

## 2024-11-12 LAB — INR BLD: 2

## 2024-11-12 PROCEDURE — 90653 IIV ADJUVANT VACCINE IM: CPT | Performed by: SURGERY

## 2024-11-12 PROCEDURE — G0008 ADMIN INFLUENZA VIRUS VAC: HCPCS | Performed by: SURGERY

## 2024-11-12 NOTE — PROGRESS NOTES
11/12/2024 INR 2.0 Sun tues thurs 2.5mg, 5mg all other days  Per Dr Calzada:Stable no change recheck in week   Kya notified in office

## 2024-11-12 NOTE — TELEPHONE ENCOUNTER
Patient came in for today. She would like to have her ears cleaned. She feels like her ears are clogged.     Patient denies pain.    Request ENT referral

## 2024-11-13 ENCOUNTER — TELEPHONE (OUTPATIENT)
Dept: ENT CLINIC | Age: 89
End: 2024-11-13

## 2024-11-13 NOTE — TELEPHONE ENCOUNTER
Scheduled 1/23/25 for New pt Ref Dr Calzada, Bilateral impacted cerumen. Not able to hear with hearing aides. Please advise daughter, Kya [phone] if able to move up sooner.

## 2024-11-19 ENCOUNTER — ANTI-COAG VISIT (OUTPATIENT)
Dept: FAMILY MEDICINE CLINIC | Age: 89
End: 2024-11-19

## 2024-11-19 DIAGNOSIS — Z79.01 LONG TERM (CURRENT) USE OF ANTICOAGULANTS: Primary | ICD-10-CM

## 2024-11-19 LAB — INR BLD: 2.1

## 2024-11-19 NOTE — PROGRESS NOTES
11/19/2024 INR 2.1 Sun tues thurs 2.5mg, 5mg all other days. Per Dr Calzada: Stable no change recheck in week Kya notified

## 2024-11-26 ENCOUNTER — ANTI-COAG VISIT (OUTPATIENT)
Dept: FAMILY MEDICINE CLINIC | Age: 88
End: 2024-11-26

## 2024-11-26 DIAGNOSIS — I10 PRIMARY HYPERTENSION: ICD-10-CM

## 2024-11-26 DIAGNOSIS — Z79.01 LONG TERM (CURRENT) USE OF ANTICOAGULANTS: Primary | ICD-10-CM

## 2024-11-26 LAB — INR BLD: 2.1

## 2024-11-26 RX ORDER — AMLODIPINE BESYLATE 5 MG/1
5 TABLET ORAL DAILY
Qty: 90 TABLET | Refills: 3 | Status: SHIPPED | OUTPATIENT
Start: 2024-11-26

## 2024-11-26 RX ORDER — LOSARTAN POTASSIUM 100 MG/1
100 TABLET ORAL DAILY
Qty: 90 TABLET | Refills: 3 | Status: SHIPPED | OUTPATIENT
Start: 2024-11-26

## 2024-11-26 NOTE — PROGRESS NOTES
11/26/2024 INR 2.1 Sun tues thurs 2.5mg, 5mg all other days    Per Dr Calzada: Stable no change recheck in week   Kya notified

## 2024-11-26 NOTE — TELEPHONE ENCOUNTER
Name of Medication(s) Requested:  Requested Prescriptions     Pending Prescriptions Disp Refills    losartan (COZAAR) 100 MG tablet [Pharmacy Med Name: Losartan Potassium 100 MG Oral Tablet] 90 tablet 3     Sig: Take 1 tablet by mouth once daily    amLODIPine (NORVASC) 5 MG tablet [Pharmacy Med Name: amLODIPine Besylate 5 MG Oral Tablet] 90 tablet 3     Sig: Take 1 tablet by mouth once daily       Medication is on current medication list Yes    Dosage and directions were verified? Yes    Quantity verified: 90 day supply     Pharmacy Verified?  Yes    Last Appointment:  6/20/2024    Future appts:  Future Appointments   Date Time Provider Department Center   12/2/2024 10:30 AM Du Callaway, APRN - CNP [facility]   12/20/2024  2:00 PM Tyrone Calzada DO [place] [facility]   6/25/2025  1:30 PM Tyrone Calzada, DO [facility]        (If no appt send self scheduling link. .REFILLAPPT)  Scheduling request sent?     [] Yes  [x] No    Does patient need updated?  [] Yes  [x] No

## 2024-12-02 ENCOUNTER — OFFICE VISIT (OUTPATIENT)
Dept: ENT CLINIC | Age: 88
End: 2024-12-02
Payer: MEDICARE

## 2024-12-02 VITALS
WEIGHT: 138.2 LBS | BODY MASS INDEX: 25.43 KG/M2 | HEIGHT: 62 IN | OXYGEN SATURATION: 97 % | HEART RATE: 67 BPM | SYSTOLIC BLOOD PRESSURE: 115 MMHG | RESPIRATION RATE: 17 BRPM | DIASTOLIC BLOOD PRESSURE: 62 MMHG | TEMPERATURE: 98 F

## 2024-12-02 DIAGNOSIS — H61.23 BILATERAL IMPACTED CERUMEN: Primary | ICD-10-CM

## 2024-12-02 LAB — INR BLD: 2.2

## 2024-12-02 PROCEDURE — 1159F MED LIST DOCD IN RCRD: CPT | Performed by: NURSE PRACTITIONER

## 2024-12-02 PROCEDURE — 1123F ACP DISCUSS/DSCN MKR DOCD: CPT | Performed by: NURSE PRACTITIONER

## 2024-12-02 PROCEDURE — 1160F RVW MEDS BY RX/DR IN RCRD: CPT | Performed by: NURSE PRACTITIONER

## 2024-12-02 PROCEDURE — 99203 OFFICE O/P NEW LOW 30 MIN: CPT | Performed by: NURSE PRACTITIONER

## 2024-12-02 PROCEDURE — 69210 REMOVE IMPACTED EAR WAX UNI: CPT | Performed by: NURSE PRACTITIONER

## 2024-12-02 ASSESSMENT — ENCOUNTER SYMPTOMS
RESPIRATORY NEGATIVE: 1
RHINORRHEA: 0
EYES NEGATIVE: 1
SINUS PAIN: 0
SHORTNESS OF BREATH: 0
STRIDOR: 0
SINUS PRESSURE: 0

## 2024-12-02 NOTE — PROGRESS NOTES
shortness of breath and stridor.    Cardiovascular: Negative.  Negative for chest pain and palpitations.   Endocrine: Negative.    Musculoskeletal: Negative.    Skin: Negative.    Neurological: Negative.  Negative for dizziness.   Hematological: Negative.    Psychiatric/Behavioral: Negative.         /62 (Site: Left Upper Arm, Position: Sitting, Cuff Size: Medium Adult)   Pulse 67   Temp 98 °F (36.7 °C) (Temporal)   Resp 17   Ht 1.575 m (5' 2.01\")   Wt 62.7 kg (138 lb 3.2 oz)   SpO2 97%   BMI 25.27 kg/m²   Physical Exam  Constitutional:       Appearance: Normal appearance.   HENT:      Head: Normocephalic.      Right Ear: Tympanic membrane, ear canal and external ear normal. Decreased hearing noted. There is impacted cerumen.      Left Ear: Tympanic membrane, ear canal and external ear normal. Decreased hearing noted. There is impacted cerumen.      Nose: Nose normal. No rhinorrhea.      Right Turbinates: Not pale.      Left Turbinates: Not pale.   Eyes:      Conjunctiva/sclera: Conjunctivae normal.      Pupils: Pupils are equal, round, and reactive to light.   Cardiovascular:      Rate and Rhythm: Normal rate and regular rhythm.      Pulses: Normal pulses.   Pulmonary:      Effort: Pulmonary effort is normal. No respiratory distress.      Breath sounds: No stridor.   Musculoskeletal:         General: Normal range of motion.      Cervical back: Normal range of motion. No rigidity. No muscular tenderness.   Skin:     General: Skin is warm and dry.   Neurological:      General: No focal deficit present.      Mental Status: She is alert and oriented to person, place, and time.   Psychiatric:         Mood and Affect: Mood normal.         Behavior: Behavior normal.         Thought Content: Thought content normal.         Judgment: Judgment normal.       IMPRESSION/PLAN:    Cerumen removal     Auditory canal(s) both ears partially obstructed with cerumen. Cerumen was gently removed using soft plastic curette,

## 2024-12-03 ENCOUNTER — ANTI-COAG VISIT (OUTPATIENT)
Dept: FAMILY MEDICINE CLINIC | Age: 88
End: 2024-12-03

## 2024-12-03 DIAGNOSIS — Z79.01 LONG TERM (CURRENT) USE OF ANTICOAGULANTS: Primary | ICD-10-CM

## 2024-12-03 NOTE — PROGRESS NOTES
12/2/2024 INR 2.2  Sun tues thurs 2.5mg, 5mg all other days    Per Dr Calzada: Stable no change recheck in week Kya notified

## 2024-12-10 ENCOUNTER — ANTI-COAG VISIT (OUTPATIENT)
Dept: FAMILY MEDICINE CLINIC | Age: 88
End: 2024-12-10

## 2024-12-10 DIAGNOSIS — Z79.01 LONG TERM (CURRENT) USE OF ANTICOAGULANTS: Primary | ICD-10-CM

## 2024-12-10 LAB — INR BLD: 1.8

## 2024-12-10 NOTE — PROGRESS NOTES
Increase dose to 2.5mg Sun, Thu and 5mg on all other days. So she can take 5mg today. Repeat 1 week.

## 2024-12-10 NOTE — PROGRESS NOTES
12/10/2024 INR 1.8 Sun tues thurs 2.5mg, 5mg all other days  Per Dr Case Increase dose to 2.5mg Sun, Thu and 5mg on all other days. So she can take 5mg today. Repeat 1 week.   Kya notified

## 2024-12-17 ENCOUNTER — ANTI-COAG VISIT (OUTPATIENT)
Dept: FAMILY MEDICINE CLINIC | Age: 88
End: 2024-12-17

## 2024-12-17 DIAGNOSIS — Z79.01 LONG TERM (CURRENT) USE OF ANTICOAGULANTS: Primary | ICD-10-CM

## 2024-12-17 LAB
ALBUMIN: NORMAL
ALP BLD-CCNC: NORMAL U/L
ALT SERPL-CCNC: NORMAL U/L
ANION GAP SERPL CALCULATED.3IONS-SCNC: NORMAL MMOL/L
AST SERPL-CCNC: NORMAL U/L
BASOPHILS ABSOLUTE: NORMAL
BASOPHILS RELATIVE PERCENT: NORMAL
BILIRUB SERPL-MCNC: NORMAL MG/DL
BUN BLDV-MCNC: NORMAL MG/DL
CALCIUM SERPL-MCNC: NORMAL MG/DL
CHLORIDE BLD-SCNC: NORMAL MMOL/L
CHOLESTEROL, FASTING: NORMAL
CO2: NORMAL
CREAT SERPL-MCNC: NORMAL MG/DL
EOSINOPHILS ABSOLUTE: NORMAL
EOSINOPHILS RELATIVE PERCENT: NORMAL
GFR, ESTIMATED: NORMAL
GLUCOSE BLD-MCNC: NORMAL MG/DL
HCT VFR BLD CALC: NORMAL %
HDLC SERPL-MCNC: NORMAL MG/DL
HEMOGLOBIN: NORMAL
INR BLD: 1.8
LDL CHOLESTEROL: NORMAL
LYMPHOCYTES ABSOLUTE: NORMAL
LYMPHOCYTES RELATIVE PERCENT: NORMAL
MCH RBC QN AUTO: NORMAL PG
MCHC RBC AUTO-ENTMCNC: NORMAL G/DL
MCV RBC AUTO: NORMAL FL
MONOCYTES ABSOLUTE: NORMAL
MONOCYTES RELATIVE PERCENT: NORMAL
NEUTROPHILS ABSOLUTE: NORMAL
NEUTROPHILS RELATIVE PERCENT: NORMAL
PDW BLD-RTO: NORMAL %
PLATELET # BLD: NORMAL 10*3/UL
PMV BLD AUTO: NORMAL FL
POTASSIUM SERPL-SCNC: NORMAL MMOL/L
RBC # BLD: NORMAL 10*6/UL
SODIUM BLD-SCNC: NORMAL MMOL/L
TOTAL PROTEIN: NORMAL
TRIGLYCERIDE, FASTING: NORMAL
WBC # BLD: NORMAL 10*3/UL

## 2024-12-19 DIAGNOSIS — E78.00 PURE HYPERCHOLESTEROLEMIA: ICD-10-CM

## 2024-12-19 DIAGNOSIS — N18.2 CKD (CHRONIC KIDNEY DISEASE) STAGE 2, GFR 60-89 ML/MIN: ICD-10-CM

## 2024-12-19 DIAGNOSIS — I48.91 ATRIAL FIBRILLATION, UNSPECIFIED TYPE (HCC): ICD-10-CM

## 2024-12-20 ENCOUNTER — TELEPHONE (OUTPATIENT)
Dept: FAMILY MEDICINE CLINIC | Age: 88
End: 2024-12-20

## 2024-12-20 NOTE — TELEPHONE ENCOUNTER
Called and spoke to pt's dgt and rescheduled today's appt to 4/21/25.    ----- Message from Nazia HERRERA sent at 12/20/2024 10:39 AM EST -----  Regarding: [facility] Appointment Request  [facility] Appointment Request    Patient needs appointment for [facility] Appointment Type: Existing Condition Follow Up.    Patient Requested Dates(s): as soon as possible   Patient Requested Time: mid mornings   Provider Name:Tyrone Calzada DO    Reason for Appointment Request: Established Patient - Available appointments did not meet patient need  --------------------------------------------------------------------------------------------------------------------------    Relationship to Patient: Covered Entity      Call Back Information: OK to leave message on voicemail  Preferred Call Back Number: Phone [phone]           Edwina needs to have same day appointment as her daughter edin

## 2024-12-24 LAB — INR BLD: 2

## 2024-12-26 ENCOUNTER — ANTI-COAG VISIT (OUTPATIENT)
Dept: FAMILY MEDICINE CLINIC | Age: 88
End: 2024-12-26

## 2024-12-26 DIAGNOSIS — Z79.01 LONG TERM (CURRENT) USE OF ANTICOAGULANTS: Primary | ICD-10-CM

## 2024-12-31 LAB — INR BLD: 2.2

## 2025-01-02 ENCOUNTER — ANTI-COAG VISIT (OUTPATIENT)
Dept: FAMILY MEDICINE CLINIC | Age: 89
End: 2025-01-02
Payer: MEDICARE

## 2025-01-02 DIAGNOSIS — Z79.01 LONG TERM (CURRENT) USE OF ANTICOAGULANTS: Primary | ICD-10-CM

## 2025-01-02 PROCEDURE — 93793 ANTICOAG MGMT PT WARFARIN: CPT | Performed by: SURGERY

## 2025-01-02 NOTE — PROGRESS NOTES
12/31/2024 INR 2.2 Coumadin 2.5mg Sun, Thu and 5mg on all other days.  Per Dr Calzada: Stable no change recheck in week. Kya notified

## 2025-01-07 ENCOUNTER — ANTI-COAG VISIT (OUTPATIENT)
Dept: FAMILY MEDICINE CLINIC | Age: 89
End: 2025-01-07
Payer: MEDICARE

## 2025-01-07 DIAGNOSIS — Z79.01 LONG TERM (CURRENT) USE OF ANTICOAGULANTS: Primary | ICD-10-CM

## 2025-01-07 LAB — INR BLD: 2.2

## 2025-01-07 PROCEDURE — 93793 ANTICOAG MGMT PT WARFARIN: CPT | Performed by: SURGERY

## 2025-01-07 NOTE — PROGRESS NOTES
1/7/2025 INR 2.2 Coumadin 2.5mg Sun, Thu and 5mg on all other days    Per Dr Calzada: Stable no change recheck in week. Kya notified

## 2025-01-14 ENCOUNTER — ANTI-COAG VISIT (OUTPATIENT)
Dept: FAMILY MEDICINE CLINIC | Age: 89
End: 2025-01-14

## 2025-01-14 DIAGNOSIS — E78.5 HYPERLIPIDEMIA, UNSPECIFIED HYPERLIPIDEMIA TYPE: ICD-10-CM

## 2025-01-14 DIAGNOSIS — Z79.01 LONG TERM (CURRENT) USE OF ANTICOAGULANTS: Primary | ICD-10-CM

## 2025-01-14 LAB — INR BLD: 2.1

## 2025-01-14 NOTE — PROGRESS NOTES
1/14/2025 INR 2.1 Coumadin 2.5mg Sun, Thu and 5mg on all other days    Per Dr Calzada: Stable no change recheck in week.  Kya notified

## 2025-01-14 NOTE — TELEPHONE ENCOUNTER
Name of Medication(s) Requested:  Requested Prescriptions     Pending Prescriptions Disp Refills    simvastatin (ZOCOR) 20 MG tablet [Pharmacy Med Name: Simvastatin 20 MG Oral Tablet] 90 tablet 3     Sig: Take 1 tablet by mouth nightly       Medication is on current medication list Yes    Dosage and directions were verified? Yes    Quantity verified: 90 day supply     Pharmacy Verified?  Yes    Last Appointment:  6/20/2024    Future appts:  Future Appointments   Date Time Provider Department Center   4/21/2025  1:30 PM Tyrone Calzada, DO [facility] DEP   6/3/2025 10:45 AM Thomas Blackburn, DO [facility]   6/25/2025  1:30 PM Tyrone Calzada, DO [facility] DEP        (If no appt send self scheduling link. .REFILLAPPT)  Scheduling request sent?     [] Yes  [x] No    Does patient need updated?  [] Yes  [x] No

## 2025-01-15 RX ORDER — SIMVASTATIN 20 MG
20 TABLET ORAL NIGHTLY
Qty: 90 TABLET | Refills: 3 | Status: SHIPPED | OUTPATIENT
Start: 2025-01-15

## 2025-01-20 LAB — INR BLD: 1.5

## 2025-01-21 ENCOUNTER — ANTI-COAG VISIT (OUTPATIENT)
Dept: FAMILY MEDICINE CLINIC | Age: 89
End: 2025-01-21

## 2025-01-21 DIAGNOSIS — Z79.01 LONG TERM (CURRENT) USE OF ANTICOAGULANTS: Primary | ICD-10-CM

## 2025-01-21 NOTE — PROGRESS NOTES
2025 INR 1.5 Coumadin 2.5mg Sun, Thu and 5mg on all other days   Confirmed with Kya, patient is taking medication as prescribed  No changes to the followin.)  Verify no sudden change to diet or intake of vitamin K (leafy greens, liver, oils, over the counter fish oil pills).  Ensure no diarrhea.  Ensure no other OTC supplements herbs or other new medications prescribed that we are unaware of.  Ensure no falls or other events that could have resulted in a bleeding event (such as extensive bruising around the hip after a fall).

## 2025-01-24 ENCOUNTER — ANTI-COAG VISIT (OUTPATIENT)
Dept: FAMILY MEDICINE CLINIC | Age: 89
End: 2025-01-24
Payer: MEDICARE

## 2025-01-24 DIAGNOSIS — Z79.01 LONG TERM (CURRENT) USE OF ANTICOAGULANTS: Primary | ICD-10-CM

## 2025-01-24 LAB — INR BLD: 2.2

## 2025-01-24 PROCEDURE — 93793 ANTICOAG MGMT PT WARFARIN: CPT | Performed by: SURGERY

## 2025-01-24 NOTE — PROGRESS NOTES
1/24/2024 INR 2.2 Coumadin 2.5mg Sun, Thu and 5mg on all other days  Per Dr Calzada: Recheck in week but lets start the process to change frequency to monthly     Kya notified

## 2025-01-31 ENCOUNTER — ANTI-COAG VISIT (OUTPATIENT)
Dept: FAMILY MEDICINE CLINIC | Age: 89
End: 2025-01-31
Payer: MEDICARE

## 2025-01-31 DIAGNOSIS — Z79.01 LONG TERM (CURRENT) USE OF ANTICOAGULANTS: Primary | ICD-10-CM

## 2025-01-31 LAB — INR BLD: 2.2

## 2025-01-31 PROCEDURE — 93793 ANTICOAG MGMT PT WARFARIN: CPT | Performed by: SURGERY

## 2025-01-31 NOTE — PROGRESS NOTES
1/31/2025 INR 2.2 Coumadin 2.5mg Sun, Thu and 5mg on all other days  Stable no change recheck in one month   Kya notified

## 2025-02-07 ENCOUNTER — ANTI-COAG VISIT (OUTPATIENT)
Dept: FAMILY MEDICINE CLINIC | Age: 89
End: 2025-02-07
Payer: MEDICARE

## 2025-02-07 DIAGNOSIS — Z79.01 LONG TERM (CURRENT) USE OF ANTICOAGULANTS: Primary | ICD-10-CM

## 2025-02-07 LAB — INR BLD: 1.3

## 2025-02-07 PROCEDURE — 93793 ANTICOAG MGMT PT WARFARIN: CPT | Performed by: SURGERY

## 2025-02-07 NOTE — PROGRESS NOTES
Per Dr. Calzada:   1.)  Verify compliance (if non-compliant or other factors present resume therapy at previous dose).   2.)  Verify no sudden change to diet or intake of vitamin K (leafy greens, liver, oils, over the counter fish oil pills).  Ensure no diarrhea.  Ensure no other OTC supplements herbs or other new medications prescribed that we are unaware of.  Ensure no falls or other events that could have resulted in a bleeding event (such as extensive bruising around the hip after a fall).   3.)  Return/recheck: now with home INR monitor and again 3 - 7 days     Spoke with Kya and patient.  Patient states no changes to diet, no increase in leafy vegetables, no diarrhea, no new medications, no recent falls and no bleeding/bruising.    Informed patient of compliance with medication and to resume previous dosing schedule of 2.5 mg on Sunday and Thursday and 5 mg on all other days.

## 2025-02-13 LAB — INR BLD: 1.6

## 2025-02-14 ENCOUNTER — ANTI-COAG VISIT (OUTPATIENT)
Dept: FAMILY MEDICINE CLINIC | Age: 89
End: 2025-02-14
Payer: MEDICARE

## 2025-02-14 DIAGNOSIS — Z79.01 LONG TERM (CURRENT) USE OF ANTICOAGULANTS: Primary | ICD-10-CM

## 2025-02-14 PROCEDURE — 93793 ANTICOAG MGMT PT WARFARIN: CPT | Performed by: SURGERY

## 2025-02-14 NOTE — PROGRESS NOTES
2/13/2025 INR 1.6 Coumadin 2.5mg Sun, Thu and 5mg on all other days  2/13/2025  Patient Findings     Negatives:  Signs/symptoms of thrombosis, Signs/symptoms of bleeding,   Laboratory test error suspected, Change in health, Change in alcohol use,   Change in activity, Upcoming invasive procedure, Emergency department   visit, Upcoming dental procedure, Missed doses, Extra doses, Change in   medications, Change in diet/appetite, Hospital admission, Bruising, Other   complaints

## 2025-02-22 LAB — INR BLD: 1.7

## 2025-02-24 ENCOUNTER — ANTI-COAG VISIT (OUTPATIENT)
Dept: FAMILY MEDICINE CLINIC | Age: 89
End: 2025-02-24
Payer: MEDICARE

## 2025-02-24 DIAGNOSIS — Z79.01 LONG TERM (CURRENT) USE OF ANTICOAGULANTS: Primary | ICD-10-CM

## 2025-02-24 PROCEDURE — 93793 ANTICOAG MGMT PT WARFARIN: CPT | Performed by: SURGERY

## 2025-02-24 NOTE — PROGRESS NOTES
2/22/2025 INR 1.7 Coumadin 2.5mg Sun, Thu and 5mg on all other days  2/24/2025  Patient Findings     Negatives:  Signs/symptoms of thrombosis, Signs/symptoms of bleeding,   Laboratory test error suspected, Change in health, Change in alcohol use,   Change in activity, Upcoming invasive procedure, Emergency department   visit, Upcoming dental procedure, Missed doses, Extra doses, Change in   medications, Change in diet/appetite, Hospital admission, Bruising, Other   complaints

## 2025-03-05 ENCOUNTER — ANTI-COAG VISIT (OUTPATIENT)
Dept: FAMILY MEDICINE CLINIC | Age: 89
End: 2025-03-05
Payer: MEDICARE

## 2025-03-05 DIAGNOSIS — Z79.01 LONG TERM (CURRENT) USE OF ANTICOAGULANTS: Primary | ICD-10-CM

## 2025-03-05 LAB — INR BLD: 2.4

## 2025-03-05 PROCEDURE — 93793 ANTICOAG MGMT PT WARFARIN: CPT | Performed by: SURGERY

## 2025-03-10 DIAGNOSIS — I48.91 ATRIAL FIBRILLATION, UNSPECIFIED TYPE (HCC): ICD-10-CM

## 2025-03-10 DIAGNOSIS — Z79.01 ANTICOAGULATED: ICD-10-CM

## 2025-03-10 NOTE — TELEPHONE ENCOUNTER
Name of Medication(s) Requested:  Requested Prescriptions     Pending Prescriptions Disp Refills    warfarin (COUMADIN) 5 MG tablet [Pharmacy Med Name: Warfarin Sodium 5 MG Oral Tablet] 90 tablet 0     Sig: Take 1 tablet by mouth once daily       Medication is on current medication list Yes    Dosage and directions were verified? Yes    Quantity verified: 90 day supply     Pharmacy Verified?  Yes    Last Appointment:  6/20/2024    Future appts:  Future Appointments   Date Time Provider Department Center   4/21/2025  1:30 PM Tyrone Calzada, DO [facility] DEP   6/3/2025 10:45 AM Thomas Blackburn, DO [facility]   6/25/2025  1:30 PM Tyrone Calzada, DO [facility] DEP        (If no appt send self scheduling link. .REFILLAPPT)  Scheduling request sent?     [] Yes  [x] No    Does patient need updated?  [] Yes  [x] No

## 2025-03-12 RX ORDER — WARFARIN SODIUM 5 MG/1
5 TABLET ORAL DAILY
Qty: 90 TABLET | Refills: 0 | Status: SHIPPED | OUTPATIENT
Start: 2025-03-12

## 2025-03-14 ENCOUNTER — ANTI-COAG VISIT (OUTPATIENT)
Dept: FAMILY MEDICINE CLINIC | Age: 89
End: 2025-03-14
Payer: MEDICARE

## 2025-03-14 DIAGNOSIS — Z79.01 LONG TERM (CURRENT) USE OF ANTICOAGULANTS: Primary | ICD-10-CM

## 2025-03-14 LAB — INR BLD: 3

## 2025-03-14 PROCEDURE — 93793 ANTICOAG MGMT PT WARFARIN: CPT | Performed by: SURGERY

## 2025-03-14 NOTE — PROGRESS NOTES
Spoke with daughter Kya and informed Continue coumadin dose. Recheck INR 4/7/2025     Kya verbalized understanding.

## 2025-03-21 ENCOUNTER — ANTI-COAG VISIT (OUTPATIENT)
Dept: FAMILY MEDICINE CLINIC | Age: 89
End: 2025-03-21
Payer: MEDICARE

## 2025-03-21 DIAGNOSIS — Z79.01 LONG TERM (CURRENT) USE OF ANTICOAGULANTS: Primary | ICD-10-CM

## 2025-03-21 LAB — INR BLD: 2.6

## 2025-03-21 PROCEDURE — 93793 ANTICOAG MGMT PT WARFARIN: CPT | Performed by: SURGERY

## 2025-03-21 NOTE — PROGRESS NOTES
3/21/2025 INR 2.6 Coumadin 2.5mg Sun, Thu and 5mg on all other days  3/21/2025  Patient Findings     Negatives:  Signs/symptoms of thrombosis, Signs/symptoms of bleeding,   Laboratory test error suspected, Change in health, Change in alcohol use,   Change in activity, Upcoming invasive procedure, Emergency department   visit, Upcoming dental procedure, Missed doses, Extra doses, Change in   medications, Change in diet/appetite, Hospital admission, Bruising, Other   complaints

## 2025-03-28 ENCOUNTER — ANTI-COAG VISIT (OUTPATIENT)
Dept: FAMILY MEDICINE CLINIC | Age: 89
End: 2025-03-28
Payer: MEDICARE

## 2025-03-28 DIAGNOSIS — Z79.01 LONG TERM (CURRENT) USE OF ANTICOAGULANTS: Primary | ICD-10-CM

## 2025-03-28 LAB — INR BLD: 3.3

## 2025-03-28 PROCEDURE — 93793 ANTICOAG MGMT PT WARFARIN: CPT | Performed by: SURGERY

## 2025-03-28 NOTE — PROGRESS NOTES
3/28/2025 INR 3.3 Coumadin 2.5mg Sun, Thu and 5mg on all other days  3/28/2025  Patient Findings     Negatives:  Signs/symptoms of thrombosis, Signs/symptoms of bleeding,   Laboratory test error suspected, Change in health, Change in alcohol use,   Change in activity, Upcoming invasive procedure, Emergency department   visit, Upcoming dental procedure, Missed doses, Extra doses, Change in   medications, Change in diet/appetite, Hospital admission, Bruising, Other   complaints

## 2025-03-28 NOTE — PROGRESS NOTES
Spoke with patient's sister Kya and informed.  Kya verbalized understanding.   Pt will continue current dosage and schedule of coumadin, please have pt repeat INR in 2 weeks, 4/11/25

## 2025-03-28 NOTE — PATIENT INSTRUCTIONS
Pt will continue current dosage and schedule of coumadin, please have pt repeat INR in 2 weeks, 4/11/25

## 2025-04-04 ENCOUNTER — ANTI-COAG VISIT (OUTPATIENT)
Dept: FAMILY MEDICINE CLINIC | Age: 89
End: 2025-04-04
Payer: MEDICARE

## 2025-04-04 DIAGNOSIS — Z79.01 LONG TERM (CURRENT) USE OF ANTICOAGULANTS: Primary | ICD-10-CM

## 2025-04-04 LAB — INR BLD: 3.3

## 2025-04-04 PROCEDURE — 93793 ANTICOAG MGMT PT WARFARIN: CPT | Performed by: SURGERY

## 2025-04-04 NOTE — PROGRESS NOTES
4/4/2025 INR 3.3 Coumadin 2.5mg Sun, Thu and 5mg on all other days  4/4/2025  Patient Findings     Negatives:  Signs/symptoms of thrombosis, Signs/symptoms of bleeding,   Laboratory test error suspected, Change in health, Change in alcohol use,   Change in activity, Upcoming invasive procedure, Emergency department   visit, Upcoming dental procedure, Missed doses, Extra doses, Change in   medications, Change in diet/appetite, Hospital admission, Bruising, Other   complaints

## 2025-04-04 NOTE — PROGRESS NOTES
Spoke with Kya and notified for patient to continue current dosage and schedule of coumadin, repeat INR in 1 week 4/11/25     Kya verbalized understanding.

## 2025-04-11 ENCOUNTER — ANTI-COAG VISIT (OUTPATIENT)
Dept: FAMILY MEDICINE CLINIC | Age: 89
End: 2025-04-11
Payer: MEDICARE

## 2025-04-11 DIAGNOSIS — Z79.01 LONG TERM (CURRENT) USE OF ANTICOAGULANTS: Primary | ICD-10-CM

## 2025-04-11 LAB — INR BLD: 2.1

## 2025-04-11 PROCEDURE — 93793 ANTICOAG MGMT PT WARFARIN: CPT | Performed by: SURGERY

## 2025-04-11 NOTE — PROGRESS NOTES
4/11/2025 INR 2.1 Coumadin 2.5mg Sun, Thu and 5mg on all other days   4/11/2025  Patient Findings     Negatives:  Signs/symptoms of thrombosis, Signs/symptoms of bleeding,   Laboratory test error suspected, Change in health, Change in alcohol use,   Change in activity, Upcoming invasive procedure, Emergency department   visit, Upcoming dental procedure, Missed doses, Extra doses, Change in   medications, Change in diet/appetite, Hospital admission, Bruising, Other   complaints

## 2025-04-18 ENCOUNTER — ANTI-COAG VISIT (OUTPATIENT)
Dept: FAMILY MEDICINE CLINIC | Age: 89
End: 2025-04-18
Payer: MEDICARE

## 2025-04-18 DIAGNOSIS — Z79.01 LONG TERM (CURRENT) USE OF ANTICOAGULANTS: Primary | ICD-10-CM

## 2025-04-18 LAB — INR BLD: 2.1

## 2025-04-18 PROCEDURE — 93793 ANTICOAG MGMT PT WARFARIN: CPT | Performed by: SURGERY

## 2025-04-18 NOTE — PROGRESS NOTES
4/18/2025 INR 2.1 Coumadin 2.5mg Sun, Thu and 5mg on all other days   4/18/2025  Patient Findings     Negatives:  Signs/symptoms of thrombosis, Signs/symptoms of bleeding,   Laboratory test error suspected, Change in health, Change in alcohol use,   Change in activity, Upcoming invasive procedure, Emergency department   visit, Upcoming dental procedure, Missed doses, Extra doses, Change in   medications, Change in diet/appetite, Hospital admission, Bruising, Other   complaints

## 2025-04-25 ENCOUNTER — ANTI-COAG VISIT (OUTPATIENT)
Dept: FAMILY MEDICINE CLINIC | Age: 89
End: 2025-04-25
Payer: MEDICARE

## 2025-04-25 DIAGNOSIS — Z79.01 LONG TERM (CURRENT) USE OF ANTICOAGULANTS: Primary | ICD-10-CM

## 2025-04-25 LAB — INR BLD: 2.7

## 2025-04-25 PROCEDURE — 93793 ANTICOAG MGMT PT WARFARIN: CPT | Performed by: SURGERY

## 2025-04-25 NOTE — PROGRESS NOTES
4/25/2025 INR 2.7 Coumadin 2.5mg Sun, Thu and 5mg on all other days   4/25/2025  Patient Findings     Negatives:  Signs/symptoms of thrombosis, Signs/symptoms of bleeding,   Laboratory test error suspected, Change in health, Change in alcohol use,   Change in activity, Upcoming invasive procedure, Emergency department   visit, Upcoming dental procedure, Missed doses, Extra doses, Change in   medications, Change in diet/appetite, Hospital admission, Bruising, Other   complaints

## 2025-05-02 ENCOUNTER — ANTI-COAG VISIT (OUTPATIENT)
Dept: FAMILY MEDICINE CLINIC | Age: 89
End: 2025-05-02
Payer: MEDICARE

## 2025-05-02 DIAGNOSIS — Z79.01 LONG TERM (CURRENT) USE OF ANTICOAGULANTS: Primary | ICD-10-CM

## 2025-05-02 LAB — INR BLD: 2.6

## 2025-05-02 PROCEDURE — 93793 ANTICOAG MGMT PT WARFARIN: CPT | Performed by: NURSE PRACTITIONER

## 2025-05-02 NOTE — PROGRESS NOTES
5/2/2025 INR 2.6 Coumadin 2.5mg Sun, Thu and 5mg on all other days   5/2/2025  Patient Findings     Negatives:  Signs/symptoms of thrombosis, Signs/symptoms of bleeding,   Laboratory test error suspected, Change in health, Change in alcohol use,   Change in activity, Upcoming invasive procedure, Emergency department   visit, Upcoming dental procedure, Missed doses, Extra doses, Change in   medications, Change in diet/appetite, Hospital admission, Bruising, Other   complaints

## 2025-05-02 NOTE — PROGRESS NOTES
Left detailed message to notify kya of results and mychart message sent directly to Kya to notify

## 2025-05-09 ENCOUNTER — ANTI-COAG VISIT (OUTPATIENT)
Dept: FAMILY MEDICINE CLINIC | Age: 89
End: 2025-05-09
Payer: MEDICARE

## 2025-05-09 DIAGNOSIS — Z79.01 LONG TERM (CURRENT) USE OF ANTICOAGULANTS: Primary | ICD-10-CM

## 2025-05-09 LAB — INR BLD: 2.9

## 2025-05-09 PROCEDURE — 93793 ANTICOAG MGMT PT WARFARIN: CPT | Performed by: NURSE PRACTITIONER

## 2025-05-09 NOTE — PROGRESS NOTES
5/9/2025 INR 2.9 Coumadin 2.5mg Sun, Thu and 5mg on all other days   5/9/2025  Patient Findings     Negatives:  Signs/symptoms of thrombosis, Signs/symptoms of bleeding,   Laboratory test error suspected, Change in health, Change in alcohol use,   Change in activity, Upcoming invasive procedure, Emergency department   visit, Upcoming dental procedure, Missed doses, Extra doses, Change in   medications, Change in diet/appetite, Hospital admission, Bruising, Other   complaints   
Kya notified   
Pt will continue current dosage and schedule of Coumadin, repeat INR in 7 days  
patient

## 2025-05-16 ENCOUNTER — ANTI-COAG VISIT (OUTPATIENT)
Dept: FAMILY MEDICINE CLINIC | Age: 89
End: 2025-05-16
Payer: MEDICARE

## 2025-05-16 DIAGNOSIS — Z79.01 LONG TERM (CURRENT) USE OF ANTICOAGULANTS: Primary | ICD-10-CM

## 2025-05-16 LAB — INR BLD: 4.1

## 2025-05-16 PROCEDURE — 93793 ANTICOAG MGMT PT WARFARIN: CPT | Performed by: NURSE PRACTITIONER

## 2025-05-16 NOTE — PROGRESS NOTES
Please have pt hold this evening dose of Coumadin then Coumadin 2.5 mg on Saturday, pt will continue current schedule of Coumadin, repeat INR in 7 days

## 2025-05-23 ENCOUNTER — ANTI-COAG VISIT (OUTPATIENT)
Dept: FAMILY MEDICINE CLINIC | Age: 89
End: 2025-05-23
Payer: MEDICARE

## 2025-05-23 DIAGNOSIS — I10 HYPERTENSION, UNSPECIFIED TYPE: ICD-10-CM

## 2025-05-23 DIAGNOSIS — Z79.01 LONG TERM (CURRENT) USE OF ANTICOAGULANTS: Primary | ICD-10-CM

## 2025-05-23 LAB — INR BLD: 2.5

## 2025-05-23 PROCEDURE — 93793 ANTICOAG MGMT PT WARFARIN: CPT | Performed by: NURSE PRACTITIONER

## 2025-05-23 RX ORDER — METOPROLOL SUCCINATE 25 MG/1
25 TABLET, EXTENDED RELEASE ORAL DAILY
Qty: 90 TABLET | Refills: 0 | Status: SHIPPED | OUTPATIENT
Start: 2025-05-23

## 2025-05-23 NOTE — TELEPHONE ENCOUNTER
Name of Medication(s) Requested:  Requested Prescriptions     Pending Prescriptions Disp Refills    metoprolol succinate (TOPROL XL) 25 MG extended release tablet [Pharmacy Med Name: Metoprolol Succinate ER 25 MG Oral Tablet Extended Release 24 Hour] 90 tablet 0     Sig: Take 1 tablet by mouth once daily       Medication is on current medication list Yes    Dosage and directions were verified? Yes    Quantity verified: 90 day supply     Pharmacy Verified?  Yes    Last Appointment:  6/20/2024    Future appts:  Future Appointments   Date Time Provider Department Center   7/1/2025  1:40 PM Jackie Parra, APRN - CNP [facility] DEP        (If no appt send self scheduling link. .REFILLAPPT)  Scheduling request sent?     [] Yes  [x] No    Does patient need updated?  [] Yes  [x] No

## 2025-05-23 NOTE — PROGRESS NOTES
Please have pt take coumadin 5 mg every Monday, Tuesday and Wednesday and 2.5 mg all other days. Repeat INR in 7 days

## 2025-05-23 NOTE — PROGRESS NOTES
5/23/2025 INR 2.5  hold this evening(5/16/25) dose of Coumadin then Coumadin 2.5 mg on Saturday, pt will continue current schedule of Coumadin, repeat INR in 7 days     5/23/2025  Patient Findings     Negatives:  Signs/symptoms of thrombosis, Signs/symptoms of bleeding,   Laboratory test error suspected, Change in health, Change in alcohol use,   Change in activity, Upcoming invasive procedure, Emergency department   visit, Upcoming dental procedure, Missed doses, Extra doses, Change in   medications, Change in diet/appetite, Hospital admission, Bruising, Other   complaints

## 2025-05-30 LAB — INR BLD: 3.5

## 2025-06-03 ENCOUNTER — ANTI-COAG VISIT (OUTPATIENT)
Dept: FAMILY MEDICINE CLINIC | Age: 89
End: 2025-06-03
Payer: MEDICARE

## 2025-06-03 DIAGNOSIS — Z79.01 LONG TERM (CURRENT) USE OF ANTICOAGULANTS: Primary | ICD-10-CM

## 2025-06-03 PROCEDURE — 93793 ANTICOAG MGMT PT WARFARIN: CPT | Performed by: NURSE PRACTITIONER

## 2025-06-03 NOTE — PROGRESS NOTES
Please have pt take Coumadin 2.5 mg every day except for Monday which will be 5 mg. Repeat INR in 7 days

## 2025-06-03 NOTE — PROGRESS NOTES
5/30/2025 INR 3.5 5 mg Mon, Tues, Wed, 2.5 mg all other days   6/3/2025  Patient Findings     Positives:  Change in health, Change in diet/appetite    Negatives:  Signs/symptoms of thrombosis, Signs/symptoms of bleeding,   Laboratory test error suspected, Change in alcohol use, Change in   activity, Upcoming invasive procedure, Emergency department visit,   Upcoming dental procedure, Missed doses, Extra doses, Change in   medications, Hospital admission, Bruising, Other complaints    Comments:  Vomited twice in 2 days, decreased appetite

## 2025-06-06 ENCOUNTER — ANTI-COAG VISIT (OUTPATIENT)
Dept: FAMILY MEDICINE CLINIC | Age: 89
End: 2025-06-06
Payer: MEDICARE

## 2025-06-06 DIAGNOSIS — Z79.01 LONG TERM (CURRENT) USE OF ANTICOAGULANTS: Primary | ICD-10-CM

## 2025-06-06 LAB — INR BLD: 1.9

## 2025-06-06 PROCEDURE — 93793 ANTICOAG MGMT PT WARFARIN: CPT | Performed by: NURSE PRACTITIONER

## 2025-06-06 NOTE — PROGRESS NOTES
6/6/2025 INR 1.9 5 mg every Mon, 2.5 mg all other days   6/6/2025  Patient Findings     Negatives:  Signs/symptoms of thrombosis, Signs/symptoms of bleeding,   Laboratory test error suspected, Change in health, Change in alcohol use,   Change in activity, Upcoming invasive procedure, Emergency department   visit, Upcoming dental procedure, Missed doses, Extra doses, Change in   medications, Change in diet/appetite, Hospital admission, Bruising, Other   complaints

## 2025-06-12 ENCOUNTER — ANTI-COAG VISIT (OUTPATIENT)
Dept: FAMILY MEDICINE CLINIC | Age: 89
End: 2025-06-12
Payer: MEDICARE

## 2025-06-12 DIAGNOSIS — Z79.01 LONG TERM (CURRENT) USE OF ANTICOAGULANTS: Primary | ICD-10-CM

## 2025-06-12 LAB — INR BLD: 1.4

## 2025-06-12 PROCEDURE — 93793 ANTICOAG MGMT PT WARFARIN: CPT | Performed by: NURSE PRACTITIONER

## 2025-06-12 NOTE — PROGRESS NOTES
Please have pt increase Coumadin to 5 mg  every Monday/Thursday and Friday. and Coumadin 2.5 mg all other days. Repeat INR on Monday 6/16/25

## 2025-06-12 NOTE — PROGRESS NOTES
6/12/2025 INR 1.4 Coumadin 5 mg every Mon, 2.5 mg all other days   6/12/2025  Patient Findings     Negatives:  Signs/symptoms of thrombosis, Signs/symptoms of bleeding,   Laboratory test error suspected, Change in health, Change in alcohol use,   Change in activity, Upcoming invasive procedure, Emergency department   visit, Upcoming dental procedure, Missed doses, Extra doses, Change in   medications, Change in diet/appetite, Hospital admission, Bruising, Other   complaints

## 2025-06-20 ENCOUNTER — ANTI-COAG VISIT (OUTPATIENT)
Dept: FAMILY MEDICINE CLINIC | Age: 89
End: 2025-06-20
Payer: MEDICARE

## 2025-06-20 DIAGNOSIS — Z79.01 LONG TERM (CURRENT) USE OF ANTICOAGULANTS: Primary | ICD-10-CM

## 2025-06-20 LAB — INR BLD: 1.6

## 2025-06-20 PROCEDURE — 93793 ANTICOAG MGMT PT WARFARIN: CPT | Performed by: NURSE PRACTITIONER

## 2025-06-20 NOTE — PROGRESS NOTES
6/20/2025 INR 1.6 Coumadin 5 mg every Mon,Thurs, Fri, 2.5 mg all other days   6/20/2025  Patient Findings     Negatives:  Signs/symptoms of thrombosis, Signs/symptoms of bleeding,   Laboratory test error suspected, Change in health, Change in alcohol use,   Change in activity, Upcoming invasive procedure, Emergency department   visit, Upcoming dental procedure, Missed doses, Extra doses, Change in   medications, Change in diet/appetite, Hospital admission, Bruising, Other   complaints

## 2025-06-20 NOTE — PROGRESS NOTES
Please let pt know to take Coumadin 2.5 mg  every Tuesday and Wednesday and will not take Coumadin 5 mg all other days. Repeat INR in 7 days

## 2025-06-27 ENCOUNTER — ANTI-COAG VISIT (OUTPATIENT)
Dept: FAMILY MEDICINE CLINIC | Age: 89
End: 2025-06-27
Payer: MEDICARE

## 2025-06-27 DIAGNOSIS — Z79.01 LONG TERM (CURRENT) USE OF ANTICOAGULANTS: Primary | ICD-10-CM

## 2025-06-27 LAB — INR BLD: 2

## 2025-06-27 PROCEDURE — 93793 ANTICOAG MGMT PT WARFARIN: CPT | Performed by: NURSE PRACTITIONER

## 2025-06-27 NOTE — PROGRESS NOTES
6/27/2025 INR 2.0 Coumadin 2.5 mg every Tuesday and Wednesday, Coumadin 5 mg all other days  6/27/2025  Patient Findings     Negatives:  Signs/symptoms of thrombosis, Signs/symptoms of bleeding,   Laboratory test error suspected, Change in health, Change in alcohol use,   Change in activity, Upcoming invasive procedure, Emergency department   visit, Upcoming dental procedure, Missed doses, Extra doses, Change in   medications, Change in diet/appetite, Hospital admission, Bruising, Other   complaints

## 2025-06-29 SDOH — HEALTH STABILITY: PHYSICAL HEALTH: ON AVERAGE, HOW MANY DAYS PER WEEK DO YOU ENGAGE IN MODERATE TO STRENUOUS EXERCISE (LIKE A BRISK WALK)?: 0 DAYS

## 2025-07-01 ENCOUNTER — OFFICE VISIT (OUTPATIENT)
Dept: FAMILY MEDICINE CLINIC | Age: 89
End: 2025-07-01
Payer: MEDICARE

## 2025-07-01 VITALS
TEMPERATURE: 97.8 F | HEIGHT: 62 IN | DIASTOLIC BLOOD PRESSURE: 64 MMHG | BODY MASS INDEX: 22.63 KG/M2 | WEIGHT: 123 LBS | HEART RATE: 76 BPM | OXYGEN SATURATION: 96 % | SYSTOLIC BLOOD PRESSURE: 118 MMHG | RESPIRATION RATE: 17 BRPM

## 2025-07-01 DIAGNOSIS — Z76.89 ENCOUNTER TO ESTABLISH CARE WITH NEW PROVIDER: Primary | ICD-10-CM

## 2025-07-01 DIAGNOSIS — N17.9 ACUTE RENAL FAILURE SUPERIMPOSED ON STAGE 3A CHRONIC KIDNEY DISEASE, UNSPECIFIED ACUTE RENAL FAILURE TYPE (HCC): ICD-10-CM

## 2025-07-01 DIAGNOSIS — I48.91 ATRIAL FIBRILLATION, UNSPECIFIED TYPE (HCC): ICD-10-CM

## 2025-07-01 DIAGNOSIS — I10 PRIMARY HYPERTENSION: ICD-10-CM

## 2025-07-01 DIAGNOSIS — E78.5 HYPERLIPIDEMIA, UNSPECIFIED HYPERLIPIDEMIA TYPE: ICD-10-CM

## 2025-07-01 DIAGNOSIS — H91.93 BILATERAL HEARING LOSS, UNSPECIFIED HEARING LOSS TYPE: ICD-10-CM

## 2025-07-01 DIAGNOSIS — Z79.01 ANTICOAGULATED: ICD-10-CM

## 2025-07-01 DIAGNOSIS — N18.31 ACUTE RENAL FAILURE SUPERIMPOSED ON STAGE 3A CHRONIC KIDNEY DISEASE, UNSPECIFIED ACUTE RENAL FAILURE TYPE (HCC): ICD-10-CM

## 2025-07-01 PROCEDURE — 1123F ACP DISCUSS/DSCN MKR DOCD: CPT | Performed by: NURSE PRACTITIONER

## 2025-07-01 PROCEDURE — 1126F AMNT PAIN NOTED NONE PRSNT: CPT | Performed by: NURSE PRACTITIONER

## 2025-07-01 PROCEDURE — 1159F MED LIST DOCD IN RCRD: CPT | Performed by: NURSE PRACTITIONER

## 2025-07-01 PROCEDURE — 99214 OFFICE O/P EST MOD 30 MIN: CPT | Performed by: NURSE PRACTITIONER

## 2025-07-01 PROCEDURE — 1160F RVW MEDS BY RX/DR IN RCRD: CPT | Performed by: NURSE PRACTITIONER

## 2025-07-01 RX ORDER — AMLODIPINE BESYLATE 5 MG/1
5 TABLET ORAL DAILY
Qty: 90 TABLET | Refills: 3 | Status: SHIPPED | OUTPATIENT
Start: 2025-07-01

## 2025-07-01 RX ORDER — WARFARIN SODIUM 5 MG/1
5 TABLET ORAL DAILY
Qty: 90 TABLET | Refills: 3 | Status: SHIPPED | OUTPATIENT
Start: 2025-07-01

## 2025-07-01 RX ORDER — SIMVASTATIN 20 MG
20 TABLET ORAL NIGHTLY
Qty: 90 TABLET | Refills: 3 | Status: SHIPPED | OUTPATIENT
Start: 2025-07-01

## 2025-07-01 RX ORDER — WARFARIN SODIUM 2.5 MG/1
2.5 TABLET ORAL DAILY
Qty: 90 TABLET | Refills: 3 | Status: SHIPPED | OUTPATIENT
Start: 2025-07-01

## 2025-07-01 RX ORDER — METOPROLOL SUCCINATE 25 MG/1
25 TABLET, EXTENDED RELEASE ORAL DAILY
Qty: 90 TABLET | Refills: 3 | Status: SHIPPED | OUTPATIENT
Start: 2025-07-01

## 2025-07-01 RX ORDER — LOSARTAN POTASSIUM 100 MG/1
100 TABLET ORAL DAILY
Qty: 90 TABLET | Refills: 3 | Status: SHIPPED | OUTPATIENT
Start: 2025-07-01

## 2025-07-01 SDOH — ECONOMIC STABILITY: FOOD INSECURITY: WITHIN THE PAST 12 MONTHS, YOU WORRIED THAT YOUR FOOD WOULD RUN OUT BEFORE YOU GOT MONEY TO BUY MORE.: NEVER TRUE

## 2025-07-01 SDOH — ECONOMIC STABILITY: FOOD INSECURITY: WITHIN THE PAST 12 MONTHS, THE FOOD YOU BOUGHT JUST DIDN'T LAST AND YOU DIDN'T HAVE MONEY TO GET MORE.: NEVER TRUE

## 2025-07-01 ASSESSMENT — PATIENT HEALTH QUESTIONNAIRE - PHQ9
SUM OF ALL RESPONSES TO PHQ QUESTIONS 1-9: 0
1. LITTLE INTEREST OR PLEASURE IN DOING THINGS: NOT AT ALL
SUM OF ALL RESPONSES TO PHQ QUESTIONS 1-9: 0
2. FEELING DOWN, DEPRESSED OR HOPELESS: NOT AT ALL
SUM OF ALL RESPONSES TO PHQ QUESTIONS 1-9: 0
SUM OF ALL RESPONSES TO PHQ QUESTIONS 1-9: 0

## 2025-07-01 ASSESSMENT — ENCOUNTER SYMPTOMS
CHOKING: 0
TROUBLE SWALLOWING: 0
CHEST TIGHTNESS: 0
ALLERGIC/IMMUNOLOGIC NEGATIVE: 1
ABDOMINAL PAIN: 0
SHORTNESS OF BREATH: 0
VOMITING: 0
BLOOD IN STOOL: 0
EYES NEGATIVE: 1
WHEEZING: 0
FACIAL SWELLING: 0

## 2025-07-01 NOTE — PROGRESS NOTES
[facility]  [address], [place] [address]  Phone: [phone]        Patient:  Edwina Cantu 89 y.o. female            Assessment and Plan     Edwina was seen today for establish care and hearing problem.    Diagnoses and all orders for this visit:    Encounter to establish care with new provider  -     CBC with Auto Differential; Future  -     Comprehensive Metabolic Panel w/ Reflex to MG; Future  -     Lipid Panel; Future  -     Medical, surgical, family history reviewed.  Allergies and medications reviewed.  Most recent labs reviewed.    Atrial fibrillation, unspecified type (HCC): Chronic, stable, reviewed most recent INR's and dose adjustments, patient's daughter checks INR at home every Thursday or Friday  -     warfarin (COUMADIN) 2.5 MG tablet; Take 1 tablet by mouth daily  -     warfarin (COUMADIN) 5 MG tablet; Take 1 tablet by mouth daily    Anticoagulated: Chronic, stable, reviewed most recent INR's and dose adjustments, therapeutic INR 2.0 on 6/27/2025  -     warfarin (COUMADIN) 2.5 MG tablet; Take 1 tablet by mouth daily  -     warfarin (COUMADIN) 5 MG tablet; Take 1 tablet by mouth daily    Acute renal failure superimposed on stage 3a chronic kidney disease, unspecified acute renal failure type (HCC): Chronic, stable, check renal function  -     Comprehensive Metabolic Panel w/ Reflex to MG; Future    Primary hypertension: Chronic, stable, refill sent  -     amLODIPine (NORVASC) 5 MG tablet; Take 1 tablet by mouth daily  -     losartan (COZAAR) 100 MG tablet; Take 1 tablet by mouth daily  -     metoprolol succinate (TOPROL XL) 25 MG extended release tablet; Take 1 tablet by mouth daily  -     CBC with Auto Differential; Future  -     Comprehensive Metabolic Panel w/ Reflex to MG; Future    Hyperlipidemia, unspecified hyperlipidemia type: Chronic, stable, refill sent, check lipid panel  -     simvastatin (ZOCOR) 20 MG tablet; Take 1 tablet by mouth nightly  -     CBC

## 2025-07-11 ENCOUNTER — ANTI-COAG VISIT (OUTPATIENT)
Dept: FAMILY MEDICINE CLINIC | Age: 89
End: 2025-07-11
Payer: MEDICARE

## 2025-07-11 DIAGNOSIS — E78.5 HYPERLIPIDEMIA, UNSPECIFIED HYPERLIPIDEMIA TYPE: ICD-10-CM

## 2025-07-11 DIAGNOSIS — I48.91 ATRIAL FIBRILLATION, UNSPECIFIED TYPE (HCC): Primary | ICD-10-CM

## 2025-07-11 LAB
INR BLD: 1.7
INTERNATIONAL NORMALIZATION RATIO, POC: 1.7

## 2025-07-11 PROCEDURE — 93793 ANTICOAG MGMT PT WARFARIN: CPT | Performed by: NURSE PRACTITIONER

## 2025-07-18 ENCOUNTER — ANTI-COAG VISIT (OUTPATIENT)
Dept: FAMILY MEDICINE CLINIC | Age: 89
End: 2025-07-18

## 2025-07-18 DIAGNOSIS — I48.91 ATRIAL FIBRILLATION, UNSPECIFIED TYPE (HCC): Primary | ICD-10-CM

## 2025-07-18 LAB — INR BLD: 1.9

## 2025-07-18 NOTE — PROGRESS NOTES
Spoke to Kya and instructed for pt to take 5 mg coumadin daily. Recheck in 7 days.    Kya verbalized understanding.

## 2025-07-25 ENCOUNTER — ANTI-COAG VISIT (OUTPATIENT)
Dept: FAMILY MEDICINE CLINIC | Age: 89
End: 2025-07-25
Payer: MEDICARE

## 2025-07-25 DIAGNOSIS — Z79.01 LONG TERM (CURRENT) USE OF ANTICOAGULANTS: ICD-10-CM

## 2025-07-25 DIAGNOSIS — I48.91 ATRIAL FIBRILLATION, UNSPECIFIED TYPE (HCC): Primary | ICD-10-CM

## 2025-07-25 LAB — INR BLD: 2.7

## 2025-07-25 PROCEDURE — 93793 ANTICOAG MGMT PT WARFARIN: CPT | Performed by: NURSE PRACTITIONER

## 2025-07-25 NOTE — PROGRESS NOTES
Informed Kya for patient to continue 5 mg daily and re-check in 1 week.    Kya verbalized understanding.

## 2025-08-01 ENCOUNTER — ANTI-COAG VISIT (OUTPATIENT)
Dept: FAMILY MEDICINE CLINIC | Age: 89
End: 2025-08-01
Payer: MEDICARE

## 2025-08-01 DIAGNOSIS — Z79.01 CURRENT USE OF LONG TERM ANTICOAGULATION: ICD-10-CM

## 2025-08-01 DIAGNOSIS — I48.91 ATRIAL FIBRILLATION, UNSPECIFIED TYPE (HCC): Primary | ICD-10-CM

## 2025-08-01 LAB — INR BLD: 3.4

## 2025-08-01 PROCEDURE — 93793 ANTICOAG MGMT PT WARFARIN: CPT | Performed by: NURSE PRACTITIONER

## 2025-08-01 NOTE — PROGRESS NOTES
Instructed Kya to skip Warfarin dose tomorrow and to give 5 mg all other days. Re-check again on Friday.     Kya verbalized understanding.

## 2025-08-08 ENCOUNTER — TELEPHONE (OUTPATIENT)
Dept: FAMILY MEDICINE CLINIC | Age: 89
End: 2025-08-08

## 2025-08-08 LAB — INR BLD: 2.5

## 2025-08-12 ENCOUNTER — ANTI-COAG VISIT (OUTPATIENT)
Dept: FAMILY MEDICINE CLINIC | Age: 89
End: 2025-08-12
Payer: MEDICARE

## 2025-08-12 DIAGNOSIS — Z79.01 LONG TERM CURRENT USE OF ANTICOAGULANT: ICD-10-CM

## 2025-08-12 DIAGNOSIS — I48.91 ATRIAL FIBRILLATION, UNSPECIFIED TYPE (HCC): Primary | ICD-10-CM

## 2025-08-12 PROCEDURE — 93793 ANTICOAG MGMT PT WARFARIN: CPT | Performed by: NURSE PRACTITIONER

## 2025-08-15 ENCOUNTER — TELEPHONE (OUTPATIENT)
Dept: FAMILY MEDICINE CLINIC | Age: 89
End: 2025-08-15

## 2025-08-15 ENCOUNTER — ANTI-COAG VISIT (OUTPATIENT)
Dept: FAMILY MEDICINE CLINIC | Age: 89
End: 2025-08-15
Payer: MEDICARE

## 2025-08-15 DIAGNOSIS — I48.91 ATRIAL FIBRILLATION, UNSPECIFIED TYPE (HCC): Primary | ICD-10-CM

## 2025-08-15 LAB — INR BLD: 3.1

## 2025-08-15 PROCEDURE — 93793 ANTICOAG MGMT PT WARFARIN: CPT | Performed by: FAMILY MEDICINE

## 2025-08-26 ENCOUNTER — TELEPHONE (OUTPATIENT)
Dept: FAMILY MEDICINE CLINIC | Age: 89
End: 2025-08-26